# Patient Record
Sex: FEMALE | Race: WHITE | NOT HISPANIC OR LATINO | Employment: UNEMPLOYED | URBAN - METROPOLITAN AREA
[De-identification: names, ages, dates, MRNs, and addresses within clinical notes are randomized per-mention and may not be internally consistent; named-entity substitution may affect disease eponyms.]

---

## 2024-02-27 ENCOUNTER — HOSPITAL ENCOUNTER (EMERGENCY)
Facility: HOSPITAL | Age: 24
Discharge: OTHER INSTITUTION | End: 2024-02-28
Attending: EMERGENCY MEDICINE
Payer: COMMERCIAL

## 2024-02-27 ENCOUNTER — APPOINTMENT (OUTPATIENT)
Dept: CARDIOLOGY | Facility: HOSPITAL | Age: 24
End: 2024-02-27
Payer: COMMERCIAL

## 2024-02-27 DIAGNOSIS — F32.A DEPRESSION, UNSPECIFIED DEPRESSION TYPE: Primary | ICD-10-CM

## 2024-02-27 DIAGNOSIS — R45.851 SUICIDAL IDEATION: ICD-10-CM

## 2024-02-27 LAB
ALBUMIN SERPL BCP-MCNC: 4.1 G/DL (ref 3.4–5)
ALP SERPL-CCNC: 49 U/L (ref 33–110)
ALT SERPL W P-5'-P-CCNC: 7 U/L (ref 7–45)
AMPHETAMINES UR QL SCN: NORMAL
ANION GAP SERPL CALC-SCNC: 14 MMOL/L (ref 10–20)
APAP SERPL-MCNC: <10 UG/ML
APPEARANCE UR: CLEAR
AST SERPL W P-5'-P-CCNC: 12 U/L (ref 9–39)
B-HCG SERPL-ACNC: <2 MIU/ML
BACTERIA #/AREA URNS AUTO: ABNORMAL /HPF
BARBITURATES UR QL SCN: NORMAL
BASOPHILS # BLD AUTO: 0.06 X10*3/UL (ref 0–0.1)
BASOPHILS NFR BLD AUTO: 0.9 %
BENZODIAZ UR QL SCN: NORMAL
BILIRUB SERPL-MCNC: 0.2 MG/DL (ref 0–1.2)
BILIRUB UR STRIP.AUTO-MCNC: NEGATIVE MG/DL
BUN SERPL-MCNC: 5 MG/DL (ref 6–23)
BZE UR QL SCN: NORMAL
CALCIUM SERPL-MCNC: 9.2 MG/DL (ref 8.6–10.3)
CANNABINOIDS UR QL SCN: NORMAL
CHLORIDE SERPL-SCNC: 104 MMOL/L (ref 98–107)
CO2 SERPL-SCNC: 24 MMOL/L (ref 21–32)
COLOR UR: ABNORMAL
CREAT SERPL-MCNC: 0.72 MG/DL (ref 0.5–1.05)
EGFRCR SERPLBLD CKD-EPI 2021: >90 ML/MIN/1.73M*2
EOSINOPHIL # BLD AUTO: 0.16 X10*3/UL (ref 0–0.7)
EOSINOPHIL NFR BLD AUTO: 2.4 %
ERYTHROCYTE [DISTWIDTH] IN BLOOD BY AUTOMATED COUNT: 13.1 % (ref 11.5–14.5)
ETHANOL SERPL-MCNC: 42 MG/DL
FENTANYL+NORFENTANYL UR QL SCN: NORMAL
GLUCOSE SERPL-MCNC: 95 MG/DL (ref 74–99)
GLUCOSE UR STRIP.AUTO-MCNC: NEGATIVE MG/DL
HCT VFR BLD AUTO: 37.9 % (ref 36–46)
HGB BLD-MCNC: 12.2 G/DL (ref 12–16)
IMM GRANULOCYTES # BLD AUTO: 0.02 X10*3/UL (ref 0–0.7)
IMM GRANULOCYTES NFR BLD AUTO: 0.3 % (ref 0–0.9)
KETONES UR STRIP.AUTO-MCNC: NEGATIVE MG/DL
LEUKOCYTE ESTERASE UR QL STRIP.AUTO: ABNORMAL
LYMPHOCYTES # BLD AUTO: 2.66 X10*3/UL (ref 1.2–4.8)
LYMPHOCYTES NFR BLD AUTO: 40.3 %
MCH RBC QN AUTO: 28.1 PG (ref 26–34)
MCHC RBC AUTO-ENTMCNC: 32.2 G/DL (ref 32–36)
MCV RBC AUTO: 87 FL (ref 80–100)
MONOCYTES # BLD AUTO: 0.44 X10*3/UL (ref 0.1–1)
MONOCYTES NFR BLD AUTO: 6.7 %
MUCOUS THREADS #/AREA URNS AUTO: ABNORMAL /LPF
NEUTROPHILS # BLD AUTO: 3.26 X10*3/UL (ref 1.2–7.7)
NEUTROPHILS NFR BLD AUTO: 49.4 %
NITRITE UR QL STRIP.AUTO: NEGATIVE
NRBC BLD-RTO: 0 /100 WBCS (ref 0–0)
OPIATES UR QL SCN: NORMAL
OXYCODONE+OXYMORPHONE UR QL SCN: NORMAL
PCP UR QL SCN: NORMAL
PH UR STRIP.AUTO: 6 [PH]
PLATELET # BLD AUTO: 307 X10*3/UL (ref 150–450)
POTASSIUM SERPL-SCNC: 3.4 MMOL/L (ref 3.5–5.3)
PROT SERPL-MCNC: 7.2 G/DL (ref 6.4–8.2)
PROT UR STRIP.AUTO-MCNC: NEGATIVE MG/DL
RBC # BLD AUTO: 4.34 X10*6/UL (ref 4–5.2)
RBC # UR STRIP.AUTO: ABNORMAL /UL
RBC #/AREA URNS AUTO: ABNORMAL /HPF
SALICYLATES SERPL-MCNC: <3 MG/DL
SODIUM SERPL-SCNC: 139 MMOL/L (ref 136–145)
SP GR UR STRIP.AUTO: 1.01
SQUAMOUS #/AREA URNS AUTO: ABNORMAL /HPF
UROBILINOGEN UR STRIP.AUTO-MCNC: <2 MG/DL
WBC # BLD AUTO: 6.6 X10*3/UL (ref 4.4–11.3)
WBC #/AREA URNS AUTO: ABNORMAL /HPF
WBC CLUMPS #/AREA URNS AUTO: ABNORMAL /HPF

## 2024-02-27 PROCEDURE — 93005 ELECTROCARDIOGRAM TRACING: CPT

## 2024-02-27 PROCEDURE — 80053 COMPREHEN METABOLIC PANEL: CPT | Performed by: EMERGENCY MEDICINE

## 2024-02-27 PROCEDURE — 87086 URINE CULTURE/COLONY COUNT: CPT | Mod: AHULAB | Performed by: EMERGENCY MEDICINE

## 2024-02-27 PROCEDURE — 99285 EMERGENCY DEPT VISIT HI MDM: CPT | Mod: 25

## 2024-02-27 PROCEDURE — 85025 COMPLETE CBC W/AUTO DIFF WBC: CPT | Performed by: EMERGENCY MEDICINE

## 2024-02-27 PROCEDURE — 81001 URINALYSIS AUTO W/SCOPE: CPT | Mod: 59 | Performed by: EMERGENCY MEDICINE

## 2024-02-27 PROCEDURE — 80307 DRUG TEST PRSMV CHEM ANLYZR: CPT | Performed by: EMERGENCY MEDICINE

## 2024-02-27 PROCEDURE — 36415 COLL VENOUS BLD VENIPUNCTURE: CPT | Performed by: EMERGENCY MEDICINE

## 2024-02-27 PROCEDURE — 80143 DRUG ASSAY ACETAMINOPHEN: CPT | Performed by: EMERGENCY MEDICINE

## 2024-02-27 PROCEDURE — 84702 CHORIONIC GONADOTROPIN TEST: CPT | Performed by: EMERGENCY MEDICINE

## 2024-02-27 SDOH — HEALTH STABILITY: MENTAL HEALTH: BEHAVIORS/MOOD: COOPERATIVE;FLAT AFFECT;SAD;TEARFUL

## 2024-02-27 SDOH — HEALTH STABILITY: MENTAL HEALTH

## 2024-02-27 SDOH — HEALTH STABILITY: MENTAL HEALTH: WAS THIS WITHIN THE PAST THREE MONTHS?: NO

## 2024-02-27 SDOH — HEALTH STABILITY: MENTAL HEALTH: HAVE YOU EVER DONE ANYTHING, STARTED TO DO ANYTHING, OR PREPARED TO DO ANYTHING TO END YOUR LIFE?: YES

## 2024-02-27 SDOH — HEALTH STABILITY: MENTAL HEALTH: HAVE YOU ACTUALLY HAD ANY THOUGHTS OF KILLING YOURSELF?: NO

## 2024-02-27 SDOH — HEALTH STABILITY: MENTAL HEALTH: BEHAVIORS/MOOD: COOPERATIVE;CRYING

## 2024-02-27 SDOH — HEALTH STABILITY: MENTAL HEALTH: HAVE YOU WISHED YOU WERE DEAD OR WISHED YOU COULD GO TO SLEEP AND NOT WAKE UP?: NO

## 2024-02-27 SDOH — HEALTH STABILITY: MENTAL HEALTH: SUICIDE ASSESSMENT: ADULT (C-SSRS)

## 2024-02-27 ASSESSMENT — ENCOUNTER SYMPTOMS
ABDOMINAL PAIN: 0
BACK PAIN: 0
FEVER: 0
ARTHRALGIAS: 0
DYSURIA: 0
HEMATURIA: 0
COUGH: 0
SHORTNESS OF BREATH: 0
PALPITATIONS: 0
VOMITING: 0
COLOR CHANGE: 0
SORE THROAT: 0
EYE PAIN: 0
DYSPHORIC MOOD: 1
SEIZURES: 0
CHILLS: 0

## 2024-02-27 ASSESSMENT — COLUMBIA-SUICIDE SEVERITY RATING SCALE - C-SSRS
6. HAVE YOU EVER DONE ANYTHING, STARTED TO DO ANYTHING, OR PREPARED TO DO ANYTHING TO END YOUR LIFE?: NO
2. HAVE YOU ACTUALLY HAD ANY THOUGHTS OF KILLING YOURSELF?: NO
1. IN THE PAST MONTH, HAVE YOU WISHED YOU WERE DEAD OR WISHED YOU COULD GO TO SLEEP AND NOT WAKE UP?: NO

## 2024-02-28 VITALS
SYSTOLIC BLOOD PRESSURE: 100 MMHG | WEIGHT: 110 LBS | OXYGEN SATURATION: 99 % | HEART RATE: 66 BPM | RESPIRATION RATE: 18 BRPM | TEMPERATURE: 97.9 F | DIASTOLIC BLOOD PRESSURE: 61 MMHG | HEIGHT: 62 IN | BODY MASS INDEX: 20.24 KG/M2

## 2024-02-28 LAB
FLUAV RNA RESP QL NAA+PROBE: NOT DETECTED
FLUBV RNA RESP QL NAA+PROBE: NOT DETECTED
SARS-COV-2 RNA RESP QL NAA+PROBE: NOT DETECTED

## 2024-02-28 PROCEDURE — 87636 SARSCOV2 & INF A&B AMP PRB: CPT | Performed by: STUDENT IN AN ORGANIZED HEALTH CARE EDUCATION/TRAINING PROGRAM

## 2024-02-28 SDOH — HEALTH STABILITY: MENTAL HEALTH: ACTIVE SUICIDAL IDEATION WITH SPECIFIC PLAN AND INTENT (PAST 1 MONTH): YES

## 2024-02-28 SDOH — ECONOMIC STABILITY: HOUSING INSECURITY: FEELS SAFE LIVING IN HOME: YES

## 2024-02-28 SDOH — HEALTH STABILITY: MENTAL HEALTH: SUICIDAL BEHAVIOR (LIFETIME): YES

## 2024-02-28 SDOH — HEALTH STABILITY: MENTAL HEALTH: NON-SPECIFIC ACTIVE SUICIDAL THOUGHTS (PAST 1 MONTH): YES

## 2024-02-28 SDOH — HEALTH STABILITY: MENTAL HEALTH: WISH TO BE DEAD (PAST 1 MONTH): YES

## 2024-02-28 SDOH — HEALTH STABILITY: MENTAL HEALTH: ACTIVE SUICIDAL IDEATION WITH SOME INTENT TO ACT, WITHOUT SPECIFIC PLAN (PAST 1 MONTH): YES

## 2024-02-28 SDOH — HEALTH STABILITY: MENTAL HEALTH
DEPRESSION SYMPTOMS: CHANGE IN ENERGY LEVEL;FEELINGS OF HELPLESSNESS;FEELINGS OF HOPELESSESS;FEELINGS OF WORTHLESSNESS;ISOLATIVE;LOSS OF INTEREST

## 2024-02-28 SDOH — HEALTH STABILITY: MENTAL HEALTH: ANXIETY SYMPTOMS: NO PROBLEMS REPORTED OR OBSERVED.

## 2024-02-28 SDOH — ECONOMIC STABILITY: GENERAL

## 2024-02-28 SDOH — HEALTH STABILITY: MENTAL HEALTH: SUICIDAL BEHAVIOR (3 MONTHS): YES

## 2024-02-28 ASSESSMENT — LIFESTYLE VARIABLES
PRESCIPTION_ABUSE_PAST_12_MONTHS: NO
SUBSTANCE_ABUSE_PAST_12_MONTHS: NO

## 2024-02-28 ASSESSMENT — ACTIVITIES OF DAILY LIVING (ADL): LACK_OF_TRANSPORTATION: NO

## 2024-02-28 NOTE — PROGRESS NOTES
From home with mother  EPAT to place- ? [place]     02/28/24 0852   Current Planned Discharge Disposition   Current Planned Discharge Disposition Psych

## 2024-02-28 NOTE — PROGRESS NOTES
EPAT - Social Work Psychiatric Assessment    Arrival Details  Mode of Arrival: Ambulance  Admission Source:  (AA Meeting)  Admission Type: Involuntary  EPAT Assessment Start Date: 02/28/24  EPAT Assessment Start Time: 0105  Name of : ILLI Morales LSW    History of Present Illness  Admission Reason: Suicidal Ideation  HPI: Patient is a 24yo female presenting to the ED via EMS with chief complaint of suicidal ideation. Reportedly, “patient has been seeing a therapist for depression. Pt was at one of her group meetings today where she expressed stuff going on with her and someone from the group called 911 and stated that the patient expressed suicidal thoughts. Patient denies SI or HI”. Patient’s chart, triage, and provider note reviewed prior to assessment. Patient has a psychiatric history of Depression and AUD. She is currently engaging in AA and weekly outpatient therapy. She reports current medication of Lexapro and endorses compliance. Patient endorses remote hx of suicide attempt over a year ago but was unwilling to share details, is indicated moderate risk at triage. She reports two previous psychiatric admissions, most recently at [facility] 11/2023. BAL 42 on arrival, UTOX not available.    SW Readmission Information   Readmission within 30 Days: No    Psychiatric Symptoms  Anxiety Symptoms: No problems reported or observed.  Depression Symptoms: Change in energy level, Feelings of helplessness, Feelings of hopelessess, Feelings of worthlessness, Isolative, Loss of interest  Yvette Symptoms: No problems reported or observed.    Psychosis Symptoms  Hallucination Type: No problems reported or observed.  Delusion Type: No problems reported or observed.    Additional Symptoms - Adult  Generalized Anxiety Disorder: No problems reported or observed.  Obsessive Compulsive Disorder: No problems reported or observed.  Panic Attack: No problems reported or observed.  Post Traumatic Stress Disorder: No  problems reported or observed.  Delirium: No problems reported or observed.    Past Psychiatric History/Meds/Treatments  Past Psychiatric History: Psychiatric Diagnosis: MDD, AUD // Current MH Center: pt reports therapist weekly // Previous Admissions: [facility] 11/2023, one previous in [place] // History of self-harm/SA: remote hx of suicide attempt over a year ago  Past Psychiatric Meds/Treatments: Lexapro, reports compliance but no prescribing physician once refill is complete  Past Violence/Victimization History: none    Current Mental Health Contacts   Name/Phone Number: Weekly therapist   Last Appointment Date: unknown  Provider Name/Phone Number: none  Provider Last Appointment Date: none    Support System: Immediate family    Living Arrangement: House, Lives with someone    Home Safety  Feels Safe Living in Home: Yes    Income Information  Employment Status for: Patient  Employment Status: Employed  Income Source: Employed  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  Financial Concerns: None    [employer] [place] Service/Education History  Current or Previous  Service: None  Education Level:  (Some college)  History of Learning Problems: No  History of School Behavior Problems: No    Social/Cultural History  Social History: [place] Citizen: Yes // Payee: none // Guardian/POA: Self  Cultural Requests During Hospitalization: none  Spiritual Requests During Hospitalization: none  Important Activities:  (none reported)    Legal  Criminal Activity/ Legal Involvement Pertinent to Current Situation/ Hospitalization: None  Legal Concerns: Denies    Drug Screening  Have you used any substances (canabis, cocaine, heroin, hallucinogens, inhalants, etc.) in the past 12 months?: No  Have you used any prescription drugs other than prescribed in the past 12 months?: No  Is a toxicology screen needed?: Yes    Stage of Change  Stage of Change: Action  History of  Treatment: AA/NA meetings  Type of Treatment Offered: Inpatient (Psych)  Treatment Offered:  (n/a)  Duration of Substance Use: unknown  Frequency of Substance Use: occasional ETOH still  Age of First Substance Use: unknown    Psychosocial  Psychosocial (WDL): Exceptions to WDL  Behaviors/Mood: Calm, Guarded, Withdrawn, Sad  Affect: Appropriate to circumstances    Orientation  Orientation Level: Oriented X4    General Appearance  Motor Activity: Unremarkable  Speech Pattern: Excessively soft  General Attitude: Guarded, Withdrawn  Appearance/Hygiene: Unremarkable    Thought Process  Coherency:  (Unremarkable)  Content: Unremarkable  Delusions:  (None)  Perception: Not altered  Hallucination: None  Judgment/Insight: Impaired  Confusion: None  Cognition: Appropriate attention/concentration, Follows commands    Sleep Pattern  Sleep Pattern: Disturbed/interrupted sleep    Risk Factors  Self Harm/Suicidal Ideation Plan: Current suicide attempt  Previous Self Harm/Suicidal Plans: Remote hx of SA over a year ago, declined to provide details  Risk Factors: Major mental illness, Substance abuse    Violence Risk Assessment  Assessment of Violence: None noted  Thoughts of Harm to Others: No    Ability to Assess Risk Screen  Risk Screen - Ability to Assess: Able to be screened  Socorro Suicide Severity Rating Scale (Screener/Recent Self-Report)  1. Wish to be Dead (Past 1 Month): Yes  2. Non-Specific Active Suicidal Thoughts (Past 1 Month): Yes  3. Active Suicidal Ideation with any Methods (Not Plan) Without Intent to Act (Past 1 Month): Yes  4. Active Suicidal Ideation with Some Intent to Act, Without Specific Plan (Past 1 Month): Yes  5. Active Suicidal Ideation with Specific Plan and Intent (Past 1 Month): Yes  6. Suicidal Behavior (Lifetime): Yes  6. Suicidal Behavior (3 Months): Yes  Calculated C-SSRS Risk Score (Lifetime/Recent): High Risk  Step 1: Risk Factors  Current & Past Psychiatric Dx: Mood disorder,  Alcohol/substance abuse disorders  Presenting Symptoms: Anhedonia, Impulsivity  Precipitants/Stressors: Substance intoxication or withdrawal, Inadequate social supports, Perceived burden on others  Change in Treatment: Hopeless or dissatisfied with provider or treatment  Access to Lethal Methods : No  Step 2: Protective Factors   Protective Factors Internal: Frustration tolerance  Protective Factors External: Supportive social network or family or friends, Engaged in work or school  Step 3: Suicidal Ideation Intensity  Most Severe Suicidal Ideation Identified: Suicide attempt by CO2  How Many Times Have You Had These Thoughts: 2-5 times in a week  When You Have the Thoughts How Long do They Last : 1-4 hours/a lot of the time  Could/Can You Stop Thinking About Killing Yourself or Wanting to Die if You Want to: Unable to control thoughts  Are There Things - Anyone or Anything - That Stopped You From Wanting to Die or Acting on: Deterrents most definitely did not stop you  What Sort of Reasons Did You Have For Thinking About Wanting to Die or Killing Yourself: Mostly to end or stop the pain (you couldn't go on living with the pain or how you were feeling)  Total Score: 20  Step 5: Documentation  Risk Level: High suicide risk (Patient upgraded to high risk given disclosure of suicide attempt earlier today, discussed with Dr. Liang)    Psychiatric Impression and Plan of Care  Assessment and Plan:     Upon assessment the patient was acutely dysthymic, withdrawn, and unwilling to engage. She required significant encouragement to provide details regarding current encounter and appeared to be minimizing her presentation. The patient ultimately endorsed having made suicidal statements while in her AA meeting and had reported “I tried using my car exhaust to kill myself but it didn't work because I have a catalytic converter on my car” earlier today. Patient endorsed this to have been an attempt to end her life and appeared  irritable to be in the ED, stating “I didn't want to be here”. She declined to provide any reasoning, triggers, or current stressors contributing to her suicide attempt earlier today. The patient reports she has been engaging in AA since this past summer but continues to drink on occasion. She reports “okay” sleep but has trouble staying asleep, denies recent disturbances in appetite or ADLs. She has a therapist whom she speaks with on a weekly basis and has been compliant with Lexapro, however, does not currently have a prescribing doctor. The patient lives with her mother, endorses feeling safe in the home, and denies access to firearms. She works at [employer] and completed some college. Patient failed to remain future/goal-oriented or help-seeking, unable to provide significant insight into symptomology. She failed to identify any major supports or deterrents to continued efforts to self-harm. Patient otherwise denied HI/AH/VH and no delusions were elicited.     Diagnostic Impression: MDD, AUD  Psychiatric Impression and Plan for Care: Patient presents as an acute risk to self given suicide attempt earlier today. Recommendation for admission discussed with Dr. Liang who is in agreement.     Specific Resources Provided to Patient: Admission    Outcome/Disposition  Patient's Perception of Outcome Achieved: unable to assess  Assessment, Recommendations and Risk Level Reviewed with: Dr. Liang  Contact Name: none provided  Contact Number(s): -  Contact Relationship: -  EPAT Assessment Completed Date: 02/28/24  EPAT Assessment Completed Time: 2288

## 2024-02-28 NOTE — PROGRESS NOTES
Patient is a 23-year-old female who presented to the emergency room for suicidal ideation and concern for possible suicide attempt.  She was initially seen and evaluated by Dr. Antoine and signed out to me by Dr. Liang after being medically cleared.  She was pending evaluation by her emergency psychiatric team.  They have evaluated patient and determined that she meets inpatient criteria.  She has been accepted to [facility] by Dr. Christina.       Vanessa Merida MD

## 2024-02-28 NOTE — ED PROVIDER NOTES
HPI   Chief Complaint   Patient presents with    Suicidal     Patient has been seeing a therapist for depression, Pt was at one of her group meetings today where she expressed stuff going on with her and someone from the group called 911 and stated that the patient expressed suicidal thoughts. Patient denies SI or HI.         23-year-old female, apparently at AA meeting, expressing feelings of depression.  Uncertain if any true suicidal statements were made, but someone at the meeting felt the need to contact 911 based on her depressive symptomatology and possible suicidal statements.  According to EMS information gleaned from the meeting personnel, she made some statement about having a garage door crush her.  Patient presents denying active suicidal or homicidal ideation but is cooperative at this point time.  Has known history of depression on Lexapro.  Denies hallucinations.  Has 1 prior attempt in the past which she will not elucidate on but was approximately a year or so ago.                          No data recorded                  Patient History   No past medical history on file.  Past Surgical History:   Procedure Laterality Date    OTHER SURGICAL HISTORY  01/03/2023    Tonsillectomy     No family history on file.  Social History     Tobacco Use    Smoking status: Not on file    Smokeless tobacco: Not on file   Substance Use Topics    Alcohol use: Not on file    Drug use: Not on file       Review of Systems   Constitutional:  Negative for chills and fever.   HENT:  Negative for ear pain and sore throat.    Eyes:  Negative for pain and visual disturbance.   Respiratory:  Negative for cough and shortness of breath.    Cardiovascular:  Negative for chest pain and palpitations.   Gastrointestinal:  Negative for abdominal pain and vomiting.   Genitourinary:  Negative for dysuria and hematuria.   Musculoskeletal:  Negative for arthralgias and back pain.   Skin:  Negative for color change and rash.    Neurological:  Negative for seizures and syncope.   Psychiatric/Behavioral:  Positive for dysphoric mood.    All other systems reviewed and are negative.       Physical Exam   ED Triage Vitals   Temp Pulse Resp BP   -- -- -- --      SpO2 Temp src Heart Rate Source Patient Position   -- -- -- --      BP Location FiO2 (%)     -- --       Physical Exam  Vitals reviewed.   Constitutional:       General: She is not in acute distress.     Appearance: She is well-developed.   HENT:      Head: Normocephalic and atraumatic.      Right Ear: External ear normal.      Left Ear: External ear normal.      Nose: Nose normal.      Mouth/Throat:      Mouth: Mucous membranes are moist.      Pharynx: Oropharynx is clear.   Eyes:      Conjunctiva/sclera: Conjunctivae normal.      Pupils: Pupils are equal, round, and reactive to light.   Neck:      Vascular: No JVD.   Cardiovascular:      Rate and Rhythm: Normal rate and regular rhythm.      Pulses: Normal pulses.   Pulmonary:      Effort: Pulmonary effort is normal. No accessory muscle usage or respiratory distress.      Breath sounds: Normal breath sounds.   Abdominal:      General: Abdomen is flat. There is no distension.      Palpations: Abdomen is soft. There is no mass.      Tenderness: There is no abdominal tenderness.   Musculoskeletal:         General: Normal range of motion.      Cervical back: Normal range of motion and neck supple.   Lymphadenopathy:      Cervical: No cervical adenopathy.   Skin:     General: Skin is warm and dry.      Capillary Refill: Capillary refill takes less than 2 seconds.      Comments: No zoster rash seen   Neurological:      General: No focal deficit present.      Mental Status: She is alert. Mental status is at baseline.   Psychiatric:         Mood and Affect: Mood normal.      Comments: Suggestion that the patient may be suicidal.  She states it may have been misconstrued to some extent denies acute active suicidal or homicidal ideation, but  nonetheless statements made in the meeting warrant psychiatric evaluation.                 ECG if performed, ordered and interpreted by ED physician:        Labs Reviewed   COMPREHENSIVE METABOLIC PANEL - Abnormal       Result Value    Glucose 95      Sodium 139      Potassium 3.4 (*)     Chloride 104      Bicarbonate 24      Anion Gap 14      Urea Nitrogen 5 (*)     Creatinine 0.72      eGFR >90      Calcium 9.2      Albumin 4.1      Alkaline Phosphatase 49      Total Protein 7.2      AST 12      Bilirubin, Total 0.2      ALT 7     ACUTE TOXICOLOGY PANEL, BLOOD - Abnormal    Acetaminophen <10.0      Salicylate  <3      Alcohol 42 (*)    URINALYSIS WITH REFLEX CULTURE AND MICROSCOPIC - Abnormal    Color, Urine Straw      Appearance, Urine Clear      Specific Gravity, Urine 1.006      pH, Urine 6.0      Protein, Urine NEGATIVE      Glucose, Urine NEGATIVE      Blood, Urine LARGE (3+) (*)     Ketones, Urine NEGATIVE      Bilirubin, Urine NEGATIVE      Urobilinogen, Urine <2.0      Nitrite, Urine NEGATIVE      Leukocyte Esterase, Urine TRACE (*)    MICROSCOPIC ONLY, URINE - Abnormal    WBC, Urine 1-5      WBC Clumps, Urine OCCASIONAL      RBC, Urine 1-2      Squamous Epithelial Cells, Urine 1-9 (SPARSE)      Bacteria, Urine 1+ (*)     Mucus, Urine 1+     URINE CULTURE - Normal    Urine Culture No significant growth     DRUG SCREEN,URINE - Normal    Amphetamine Screen, Urine Presumptive Negative      Barbiturate Screen, Urine Presumptive Negative      Benzodiazepines Screen, Urine Presumptive Negative      Cannabinoid Screen, Urine Presumptive Negative      Cocaine Metabolite Screen, Urine Presumptive Negative      Fentanyl Screen, Urine Presumptive Negative      Opiate Screen, Urine Presumptive Negative      Oxycodone Screen, Urine Presumptive Negative      PCP Screen, Urine Presumptive Negative      Narrative:     Drug screen results are presumptive and should not be used to assess   compliance with prescribed  medication. Contact the performing [facility] laboratory   to add-on definitive confirmatory testing if clinically indicated.    Toxicology screening results are reported qualitatively. The concentration must   be greater than or equal to the cutoff to be reported as positive. The concentration   at which the screening test can detect an individual drug or metabolite varies.   The absence of expected drug(s) and/or drug metabolite(s) may indicate non-compliance,   inappropriate timing of specimen collection relative to drug administration, poor drug   absorption, diluted/adulterated urine, or limitations of testing. For medical purposes   only; not valid for forensic use.    Interpretive questions should be directed to the laboratory medical directors.   HUMAN CHORIONIC GONADOTROPIN, SERUM QUANTITATIVE - Normal    HCG, Beta-Quantitative <2      Narrative:      Total HCG measurement is performed using the [employer] Access   Immunoassay which detects intact HCG and free beta HCG subunit.    This test is not indicated for use as a tumor marker.   HCG testing is performed using a different test methodology at [facility] than other [facility]. Direct result comparison   should only be made within the same method.       SARS-COV-2 AND INFLUENZA A/B PCR - Normal    Flu A Result Not Detected      Flu B Result Not Detected      Coronavirus 2019, PCR Not Detected      Narrative:     This assay has received FDA Emergency Use Authorization (EUA) and  is only authorized for the duration of time that circumstances exist to justify the authorization of the emergency use of in vitro diagnostic tests for the detection of SARS-CoV-2 virus and/or diagnosis of COVID-19 infection under section 564(b)(1) of the Act, 21 U.S.C. 360bbb-3(b)(1). Testing for SARS-CoV-2 is only recommended for patients who meet current clinical and/or epidemiological criteria as defined by federal, state, or local public health  directives. This assay is an in vitro diagnostic nucleic acid amplification test for the qualitative detection of SARS-CoV-2, Influenza A, and Influenza B from nasopharyngeal specimens and has been validated for use at [facility]. Negative results do not preclude COVID-19 infections or Influenza A/B infections, and should not be used as the sole basis for diagnosis, treatment, or other management decisions. If Influenza A/B and RSV PCR results are negative, testing for Parainfluenza virus, Adenovirus and Metapneumovirus is routinely performed for [facility] pediatric oncology and intensive care inpatients, and is available on other patients by placing an add-on request.    CBC WITH AUTO DIFFERENTIAL    WBC 6.6      nRBC 0.0      RBC 4.34      Hemoglobin 12.2      Hematocrit 37.9      MCV 87      MCH 28.1      MCHC 32.2      RDW 13.1      Platelets 307      Neutrophils % 49.4      Immature Granulocytes %, Automated 0.3      Lymphocytes % 40.3      Monocytes % 6.7      Eosinophils % 2.4      Basophils % 0.9      Neutrophils Absolute 3.26      Immature Granulocytes Absolute, Automated 0.02      Lymphocytes Absolute 2.66      Monocytes Absolute 0.44      Eosinophils Absolute 0.16      Basophils Absolute 0.06     URINALYSIS WITH REFLEX CULTURE AND MICROSCOPIC    Narrative:     The following orders were created for panel order Urinalysis with Reflex Culture and Microscopic.  Procedure                               Abnormality         Status                     ---------                               -----------         ------                     Urinalysis with Reflex C...[[id number]]  Abnormal            Final result               Extra Urine Gray Tube[[id number]]                                                         Please view results for these tests on the individual orders.          No orders to display            ED Course & MDM                ED Course as of 03/14/24 1702   Tue Feb 27, 2024   0592  ECG 12 Lead  EcG ordered and interpreted by ED physician demonstrates sinus rhythm.  77 bpm.  Normal rate rhythm and axis.  No ischemic findings.  Unremarkable ECG. [KS]      ED Course User Index  [KS] Jack Antoine MD MPH         Diagnoses as of 03/14/24 1702   Depression, unspecified depression type   Suicidal ideation       Medical Decision Making  Significant depression, questionable suicidal ideation.  Baseline toxicologic metabolic evaluation and then psychiatric disposition/evaluation.    Alcohol level 42.  Other labs unremarkable overall.    Patient medically stable and medically cleared for psychiatric disposition.          Procedure  Procedures         Jack Antoine MD MPH  02/27/24 2346       Jack Antoine MD MPH  03/08/24 1700       Jack Antoine MD MPH  03/14/24 1702

## 2024-02-28 NOTE — SIGNIFICANT EVENT
Application for Emergency Admission      Ready for Transfer?  Is the patient medically cleared for transfer to inpatient psychiatry: Yes  Has the patient been accepted to an inpatient psychiatric hospital: Yes    Application for Emergency Admission  IN ACCORDANCE WITH [place]122[place]  The Chief Clinical Officer of: Margaret 2/28/2024 .12:19 PM    Reason for Hospitalization  The undersigned has reason to believe that: Tete Kirk Is a mentally ill person subject to hospitalization by court order under division B Section 5122.01 of the Revised Code, i.e., this person:    1.Yes  Represents a substantial risk of physical harm to self as manifested by evidence of threats of, or attempts at, suicide or serious self-inflicted bodily harm    2.No Represents a substantial risk of physical harm to others as manifested by evidence of recent homicidal or other violent behavior, evidence of recent threats that place another in reasonable fear of violent behavior and serious physical harm, or other evidence of present dangerousness    3.Yes Represents a substantial and immediate risk of serious physical impairment or injury to self as manifested by  evidence that the person is unable to provide for and is not providing for the person's basic physical needs because of the person's mental illness and that appropriate provision for those needs cannot be made  immediately available in the community    4.Yes Would benefit from treatment in a hospital for his mental illness and is in need of such treatment as manifested by evidence of behavior that creates a grave and imminent risk to substantial rights of others or  himself.    5.Yes Would benefit from treatment as manifested by evidence of behavior that indicates all of the following:       (a) The person is unlikely to survive safely in the community without supervision, based on a clinical determination.       (b) The person has a history of lack of compliance with  treatment for mental illness and one of the following applies:      (i) At least twice within the thirty-six months prior to the filing of an affidavit seeking court-ordered treatment of the person under section 5122.111 of the Revised Code, the lack of compliance has been a significant factor in necessitating hospitalization in a hospital or receipt of services in a forensic or other mental health unit of a correctional facility, provided that the thirty-six-month period shall be extended by the length of any hospitalization or incarceration of the person that occurred within the thirty-six-month period.      (ii) Within the forty-eight months prior to the filing of an affidavit seeking court-ordered treatment of the person under section 5122.111 of the Revised Code, the lack of compliance resulted in one or more acts of serious violent behavior toward self or others or threats of, or attempts at, serious physical harm to self or others, provided that the forty-eight-month period shall be extended by the length of any hospitalization or incarceration of the person that occurred within the forty-eight-month period.      (c) The person, as a result of mental illness, is unlikely to voluntarily participate in necessary treatment.       (d) In view of the person's treatment history and current behavior, the person is in need of treatment in order to prevent a relapse or deterioration that would be likely to result in substantial risk of serious harm to the person or others.    (e) Represents a substantial risk of physical harm to self or others if allowed to remain at liberty pending examination.    Therefore, it is requested that said person be admitted to the above named facility.    STATEMENT OF BELIEF    Must be filled out by one of the following: a psychiatrist, licensed physician, licensed clinical psychologist, health or ,  or .  (Statement shall include the circumstances under  which the individual was taken into custody and the reason for the person's belief that hospitalization is necessary. The statement shall also include a reference to efforts made to secure the individual's property at his residence if he was taken into custody there. Every reasonable and appropriate effort should be made to take this person into custody in the least conspicuous manner possible.)        Vanessa Merida MD 2/28/2024 for Dr Liang    _____________________________________________________________   Place of Employment: [facility]    STATEMENT OF OBSERVATION BY PSYCHIATRIST, LICENSED PHYSICIAN, OR LICENSED CLINICAL PSYCHOLOGIST, IF APPLICABLE    Place of Observation (e.g., [facility], general hospital, office, emergency facility)  (If applicable, please complete)    Vansesa Merida MD 2/28/2024    _____________________________________________________________

## 2024-02-28 NOTE — PROGRESS NOTES
Transitional Care Coordination Progress Note:  Plan per Medical/Surgical team: treatment of SI with EPAT to place  Status: ED  Payor source: [employer] [place]  Discharge disposition: Home with mother, ?[place]  Potential Barriers: Hx of depression  ADOD: 2/29/2024  RICHARD Allen RN, BSN Transitional Care Coordinator ED# [phone]      02/28/24 0853   Discharge Planning   Living Arrangements Parent   Support Systems Parent;Organized support group (Comment)  (AA, outpatient therapy)   Assistance Needed EPAT to place   Type of Residence Private residence   Number of Stairs to Enter Residence 5   Number of Stairs Within Residence 15   Home or Post Acute Services Community services   Patient expects to be discharged to: from home with mother, EPAT to place, ?[place]   Does the patient need discharge transport arranged? Yes   RoundTrip coordination needed? Yes   Has discharge transport been arranged? No   Financial Resource Strain   How hard is it for you to pay for the very basics like food, housing, medical care, and heating? Not hard   Housing Stability   In the last 12 months, was there a time when you were not able to pay the mortgage or rent on time? N   In the last 12 months, how many places have you lived? 1   In the last 12 months, was there a time when you did not have a steady place to sleep or slept in a shelter (including now)? N   Transportation Needs   In the past 12 months, has lack of transportation kept you from medical appointments or from getting medications? no   In the past 12 months, has lack of transportation kept you from meetings, work, or from getting things needed for daily living? No

## 2024-02-28 NOTE — PROGRESS NOTES
I received Tete Kirk in signout from Dr. Antoine.  Please see the previous note for all HPI, PE and MDM up to the time of signout.    In brief Tete Kirk is an 23 y.o. female presenting for   Chief Complaint   Patient presents with    Suicidal     Patient has been seeing a therapist for depression, Pt was at one of her group meetings today where she expressed stuff going on with her and someone from the group called 911 and stated that the patient expressed suicidal thoughts. Patient denies SI or HI.   .  At the time of signout we were awaiting:    Pending EPAT evaluation due to possible suicidal ideation.  After EPAT team was consulted and did see the patient she reportedly admitted to a possible suicide attempt although without any additional information provided earlier today which she reportedly shared with her alcoholic Anonymous group.  Given this they did recommend admission to inpatient psychiatric center.  She is medically clear from my standpoint for inpatient psychiatric treatment at this time and will be transferred for further management    Pt Disposition:     Transfer to inpatient psychiatric center for further management.

## 2024-02-29 LAB
ATRIAL RATE: 77 BPM
BACTERIA UR CULT: NORMAL
P AXIS: 46 DEGREES
P OFFSET: 202 MS
P ONSET: 164 MS
PR INTERVAL: 114 MS
Q ONSET: 221 MS
QRS COUNT: 12 BEATS
QRS DURATION: 76 MS
QT INTERVAL: 374 MS
QTC CALCULATION(BAZETT): 423 MS
QTC FREDERICIA: 406 MS
R AXIS: 74 DEGREES
T AXIS: 50 DEGREES
T OFFSET: 408 MS
VENTRICULAR RATE: 77 BPM

## 2024-04-16 ENCOUNTER — APPOINTMENT (OUTPATIENT)
Dept: CARDIOLOGY | Facility: HOSPITAL | Age: 24
DRG: 917 | End: 2024-04-16
Payer: COMMERCIAL

## 2024-04-16 ENCOUNTER — HOSPITAL ENCOUNTER (INPATIENT)
Facility: HOSPITAL | Age: 24
LOS: 2 days | Discharge: PSYCHIATRIC OR SUBSTANCE USE FACILITY | DRG: 917 | End: 2024-04-18
Attending: STUDENT IN AN ORGANIZED HEALTH CARE EDUCATION/TRAINING PROGRAM | Admitting: INTERNAL MEDICINE
Payer: COMMERCIAL

## 2024-04-16 DIAGNOSIS — J96.01 ACUTE RESPIRATORY FAILURE WITH HYPOXIA (MULTI): ICD-10-CM

## 2024-04-16 DIAGNOSIS — T14.91XA SUICIDE ATTEMPT (MULTI): ICD-10-CM

## 2024-04-16 DIAGNOSIS — T50.902A INTENTIONAL DRUG OVERDOSE, INITIAL ENCOUNTER (MULTI): Primary | ICD-10-CM

## 2024-04-16 PROBLEM — F32.A DEPRESSION: Status: ACTIVE | Noted: 2024-04-16

## 2024-04-16 LAB
ALBUMIN SERPL-MCNC: 4.6 G/DL (ref 3.5–5)
ALP BLD-CCNC: 57 U/L (ref 35–125)
ALT SERPL-CCNC: 10 U/L (ref 5–40)
AMPHETAMINES UR QL SCN>1000 NG/ML: POSITIVE
ANION GAP SERPL CALC-SCNC: 14 MMOL/L
APAP SERPL-MCNC: <5 UG/ML
AST SERPL-CCNC: 20 U/L (ref 5–40)
ATRIAL RATE: 88 BPM
BARBITURATES UR QL SCN>300 NG/ML: NEGATIVE
BASOPHILS # BLD AUTO: 0.04 X10*3/UL (ref 0–0.1)
BASOPHILS NFR BLD AUTO: 0.5 %
BENZODIAZ UR QL SCN>300 NG/ML: NEGATIVE
BILIRUB SERPL-MCNC: 0.7 MG/DL (ref 0.1–1.2)
BUN SERPL-MCNC: 6 MG/DL (ref 8–25)
BZE UR QL SCN>300 NG/ML: NEGATIVE
CALCIUM SERPL-MCNC: 9.3 MG/DL (ref 8.5–10.4)
CANNABINOIDS UR QL SCN>50 NG/ML: NEGATIVE
CHLORIDE SERPL-SCNC: 99 MMOL/L (ref 97–107)
CO2 SERPL-SCNC: 23 MMOL/L (ref 24–31)
CREAT SERPL-MCNC: 0.6 MG/DL (ref 0.4–1.6)
EGFRCR SERPLBLD CKD-EPI 2021: >90 ML/MIN/1.73M*2
EOSINOPHIL # BLD AUTO: 0.07 X10*3/UL (ref 0–0.7)
EOSINOPHIL NFR BLD AUTO: 0.9 %
ERYTHROCYTE [DISTWIDTH] IN BLOOD BY AUTOMATED COUNT: 13.3 % (ref 11.5–14.5)
ETHANOL SERPL-MCNC: <0.01 G/DL
FENTANYL+NORFENTANYL UR QL SCN: POSITIVE
FLUAV RNA RESP QL NAA+PROBE: NOT DETECTED
FLUBV RNA RESP QL NAA+PROBE: NOT DETECTED
GLUCOSE SERPL-MCNC: 94 MG/DL (ref 65–99)
HCG UR QL IA.RAPID: NEGATIVE
HCT VFR BLD AUTO: 39 % (ref 36–46)
HGB BLD-MCNC: 12.7 G/DL (ref 12–16)
IMM GRANULOCYTES # BLD AUTO: 0.01 X10*3/UL (ref 0–0.7)
IMM GRANULOCYTES NFR BLD AUTO: 0.1 % (ref 0–0.9)
LYMPHOCYTES # BLD AUTO: 1.59 X10*3/UL (ref 1.2–4.8)
LYMPHOCYTES NFR BLD AUTO: 21.3 %
MCH RBC QN AUTO: 27.9 PG (ref 26–34)
MCHC RBC AUTO-ENTMCNC: 32.6 G/DL (ref 32–36)
MCV RBC AUTO: 86 FL (ref 80–100)
METHADONE UR QL SCN>300 NG/ML: NEGATIVE
MONOCYTES # BLD AUTO: 0.68 X10*3/UL (ref 0.1–1)
MONOCYTES NFR BLD AUTO: 9.1 %
NEUTROPHILS # BLD AUTO: 5.07 X10*3/UL (ref 1.2–7.7)
NEUTROPHILS NFR BLD AUTO: 68.1 %
NRBC BLD-RTO: 0 /100 WBCS (ref 0–0)
OPIATES UR QL SCN>300 NG/ML: POSITIVE
OXYCODONE UR QL: NEGATIVE
P AXIS: 77 DEGREES
P OFFSET: 201 MS
P ONSET: 159 MS
PCP UR QL SCN>25 NG/ML: NEGATIVE
PLATELET # BLD AUTO: 231 X10*3/UL (ref 150–450)
POTASSIUM SERPL-SCNC: 3.2 MMOL/L (ref 3.4–5.1)
PR INTERVAL: 118 MS
PROT SERPL-MCNC: 7.5 G/DL (ref 5.9–7.9)
Q ONSET: 218 MS
QRS COUNT: 14 BEATS
QRS DURATION: 88 MS
QT INTERVAL: 386 MS
QTC CALCULATION(BAZETT): 467 MS
QTC FREDERICIA: 438 MS
R AXIS: 68 DEGREES
RBC # BLD AUTO: 4.55 X10*6/UL (ref 4–5.2)
SALICYLATES SERPL-MCNC: <0 MG/DL
SARS-COV-2 RNA RESP QL NAA+PROBE: NOT DETECTED
SODIUM SERPL-SCNC: 136 MMOL/L (ref 133–145)
T AXIS: 24 DEGREES
T OFFSET: 411 MS
VENTRICULAR RATE: 88 BPM
WBC # BLD AUTO: 7.5 X10*3/UL (ref 4.4–11.3)

## 2024-04-16 PROCEDURE — 36415 COLL VENOUS BLD VENIPUNCTURE: CPT | Performed by: STUDENT IN AN ORGANIZED HEALTH CARE EDUCATION/TRAINING PROGRAM

## 2024-04-16 PROCEDURE — 99418 PROLNG IP/OBS E/M EA 15 MIN: CPT

## 2024-04-16 PROCEDURE — 2500000004 HC RX 250 GENERAL PHARMACY W/ HCPCS (ALT 636 FOR OP/ED)

## 2024-04-16 PROCEDURE — 51702 INSERT TEMP BLADDER CATH: CPT

## 2024-04-16 PROCEDURE — 96374 THER/PROPH/DIAG INJ IV PUSH: CPT

## 2024-04-16 PROCEDURE — 85025 COMPLETE CBC W/AUTO DIFF WBC: CPT | Performed by: STUDENT IN AN ORGANIZED HEALTH CARE EDUCATION/TRAINING PROGRAM

## 2024-04-16 PROCEDURE — 87636 SARSCOV2 & INF A&B AMP PRB: CPT | Performed by: STUDENT IN AN ORGANIZED HEALTH CARE EDUCATION/TRAINING PROGRAM

## 2024-04-16 PROCEDURE — 84075 ASSAY ALKALINE PHOSPHATASE: CPT | Performed by: STUDENT IN AN ORGANIZED HEALTH CARE EDUCATION/TRAINING PROGRAM

## 2024-04-16 PROCEDURE — 51701 INSERT BLADDER CATHETER: CPT

## 2024-04-16 PROCEDURE — 2020000001 HC ICU ROOM DAILY

## 2024-04-16 PROCEDURE — 80143 DRUG ASSAY ACETAMINOPHEN: CPT | Performed by: STUDENT IN AN ORGANIZED HEALTH CARE EDUCATION/TRAINING PROGRAM

## 2024-04-16 PROCEDURE — 2500000004 HC RX 250 GENERAL PHARMACY W/ HCPCS (ALT 636 FOR OP/ED): Performed by: CLINICAL NURSE SPECIALIST

## 2024-04-16 PROCEDURE — 2500000005 HC RX 250 GENERAL PHARMACY W/O HCPCS

## 2024-04-16 PROCEDURE — 99291 CRITICAL CARE FIRST HOUR: CPT

## 2024-04-16 PROCEDURE — 99223 1ST HOSP IP/OBS HIGH 75: CPT

## 2024-04-16 PROCEDURE — 81025 URINE PREGNANCY TEST: CPT

## 2024-04-16 PROCEDURE — 93005 ELECTROCARDIOGRAM TRACING: CPT

## 2024-04-16 PROCEDURE — 80307 DRUG TEST PRSMV CHEM ANLYZR: CPT

## 2024-04-16 PROCEDURE — 2500000004 HC RX 250 GENERAL PHARMACY W/ HCPCS (ALT 636 FOR OP/ED): Performed by: STUDENT IN AN ORGANIZED HEALTH CARE EDUCATION/TRAINING PROGRAM

## 2024-04-16 PROCEDURE — 99291 CRITICAL CARE FIRST HOUR: CPT | Performed by: CLINICAL NURSE SPECIALIST

## 2024-04-16 RX ORDER — NALOXONE HYDROCHLORIDE 1 MG/ML
2 INJECTION INTRAMUSCULAR; INTRAVENOUS; SUBCUTANEOUS ONCE
Status: COMPLETED | OUTPATIENT
Start: 2024-04-16 | End: 2024-04-16

## 2024-04-16 RX ORDER — CLONAZEPAM 0.5 MG/1
0.5 TABLET ORAL DAILY
COMMUNITY
End: 2024-04-18 | Stop reason: HOSPADM

## 2024-04-16 RX ORDER — NALOXONE HYDROCHLORIDE 1 MG/ML
2 INJECTION INTRAMUSCULAR; INTRAVENOUS; SUBCUTANEOUS ONCE
Status: DISCONTINUED | OUTPATIENT
Start: 2024-04-16 | End: 2024-04-16

## 2024-04-16 RX ORDER — VENLAFAXINE HYDROCHLORIDE 75 MG/1
75 CAPSULE, EXTENDED RELEASE ORAL DAILY
COMMUNITY
End: 2024-04-18 | Stop reason: HOSPADM

## 2024-04-16 RX ORDER — NALOXONE HYDROCHLORIDE 0.4 MG/ML
INJECTION, SOLUTION INTRAMUSCULAR; INTRAVENOUS; SUBCUTANEOUS
Status: COMPLETED
Start: 2024-04-16 | End: 2024-04-16

## 2024-04-16 RX ORDER — NALOXONE HYDROCHLORIDE 0.4 MG/ML
0.4 INJECTION, SOLUTION INTRAMUSCULAR; INTRAVENOUS; SUBCUTANEOUS ONCE
Status: COMPLETED | OUTPATIENT
Start: 2024-04-16 | End: 2024-04-16

## 2024-04-16 RX ORDER — NALOXONE HYDROCHLORIDE 1 MG/ML
2 INJECTION INTRAMUSCULAR; INTRAVENOUS; SUBCUTANEOUS AS NEEDED
Status: DISCONTINUED | OUTPATIENT
Start: 2024-04-16 | End: 2024-04-18 | Stop reason: HOSPADM

## 2024-04-16 RX ORDER — NALOXONE HYDROCHLORIDE 0.4 MG/ML
0.4 INJECTION, SOLUTION INTRAMUSCULAR; INTRAVENOUS; SUBCUTANEOUS AS NEEDED
Status: DISCONTINUED | OUTPATIENT
Start: 2024-04-16 | End: 2024-04-18 | Stop reason: HOSPADM

## 2024-04-16 RX ORDER — NALOXONE HYDROCHLORIDE 0.4 MG/ML
0.8 INJECTION, SOLUTION INTRAMUSCULAR; INTRAVENOUS; SUBCUTANEOUS AS NEEDED
Status: CANCELLED | OUTPATIENT
Start: 2024-04-16

## 2024-04-16 RX ORDER — NALOXONE HYDROCHLORIDE 0.4 MG/ML
INJECTION, SOLUTION INTRAMUSCULAR; INTRAVENOUS; SUBCUTANEOUS AS NEEDED
Status: CANCELLED | OUTPATIENT
Start: 2024-04-16

## 2024-04-16 RX ORDER — SODIUM CHLORIDE, SODIUM LACTATE, POTASSIUM CHLORIDE, CALCIUM CHLORIDE 600; 310; 30; 20 MG/100ML; MG/100ML; MG/100ML; MG/100ML
50 INJECTION, SOLUTION INTRAVENOUS CONTINUOUS
Status: DISCONTINUED | OUTPATIENT
Start: 2024-04-16 | End: 2024-04-17

## 2024-04-16 RX ADMIN — NALOXONE HYDROCHLORIDE 3 MG/HR: 1 INJECTION PARENTERAL at 22:10

## 2024-04-16 RX ADMIN — NALOXONE HYDROCHLORIDE 0.4 MG: 0.4 INJECTION, SOLUTION INTRAMUSCULAR; INTRAVENOUS; SUBCUTANEOUS at 20:27

## 2024-04-16 RX ADMIN — SODIUM CHLORIDE, SODIUM LACTATE, POTASSIUM CHLORIDE, AND CALCIUM CHLORIDE 50 ML/HR: 600; 310; 30; 20 INJECTION, SOLUTION INTRAVENOUS at 14:25

## 2024-04-16 RX ADMIN — NALOXONE HYDROCHLORIDE 3 MG/HR: 1 INJECTION PARENTERAL at 23:54

## 2024-04-16 RX ADMIN — SODIUM CHLORIDE 1000 ML: 900 INJECTION, SOLUTION INTRAVENOUS at 13:40

## 2024-04-16 RX ADMIN — NALOXONE HYDROCHLORIDE 1 MG/HR: 1 INJECTION PARENTERAL at 20:07

## 2024-04-16 RX ADMIN — Medication 2 L/MIN: at 20:00

## 2024-04-16 RX ADMIN — NALOXONE HYDROCHLORIDE 2 MG: 1 INJECTION PARENTERAL at 11:30

## 2024-04-16 RX ADMIN — NALOXONE HYDROCHLORIDE 0.4 MG: 0.4 INJECTION, SOLUTION INTRAMUSCULAR; INTRAVENOUS; SUBCUTANEOUS at 20:11

## 2024-04-16 RX ADMIN — NALOXONE HYDROCHLORIDE 2 MG: 1 INJECTION PARENTERAL at 12:45

## 2024-04-16 RX ADMIN — NALOXONE HYDROCHLORIDE 0.4 MG: 0.4 INJECTION, SOLUTION INTRAMUSCULAR; INTRAVENOUS; SUBCUTANEOUS at 23:53

## 2024-04-16 RX ADMIN — Medication: at 14:30

## 2024-04-16 RX ADMIN — NALOXONE HYDROCHLORIDE 2 MG: 1 INJECTION PARENTERAL at 13:05

## 2024-04-16 RX ADMIN — NALOXONE HYDROCHLORIDE 2 MCG/KG/HR: 1 INJECTION PARENTERAL at 13:39

## 2024-04-16 SDOH — SOCIAL STABILITY: SOCIAL INSECURITY: DO YOU FEEL ANYONE HAS EXPLOITED OR TAKEN ADVANTAGE OF YOU FINANCIALLY OR OF YOUR PERSONAL PROPERTY?: NO

## 2024-04-16 SDOH — SOCIAL STABILITY: SOCIAL INSECURITY: WERE YOU ABLE TO COMPLETE ALL THE BEHAVIORAL HEALTH SCREENINGS?: YES

## 2024-04-16 SDOH — SOCIAL STABILITY: SOCIAL INSECURITY: ABUSE: ADULT

## 2024-04-16 SDOH — SOCIAL STABILITY: SOCIAL INSECURITY: HAVE YOU HAD ANY THOUGHTS OF HARMING ANYONE ELSE?: NO

## 2024-04-16 SDOH — SOCIAL STABILITY: SOCIAL INSECURITY: DOES ANYONE TRY TO KEEP YOU FROM HAVING/CONTACTING OTHER FRIENDS OR DOING THINGS OUTSIDE YOUR HOME?: NO

## 2024-04-16 SDOH — SOCIAL STABILITY: SOCIAL INSECURITY: DO YOU FEEL UNSAFE GOING BACK TO THE PLACE WHERE YOU ARE LIVING?: NO

## 2024-04-16 SDOH — SOCIAL STABILITY: SOCIAL INSECURITY: ARE THERE ANY APPARENT SIGNS OF INJURIES/BEHAVIORS THAT COULD BE RELATED TO ABUSE/NEGLECT?: NO

## 2024-04-16 SDOH — SOCIAL STABILITY: SOCIAL INSECURITY: HAVE YOU HAD THOUGHTS OF HARMING ANYONE ELSE?: NO

## 2024-04-16 SDOH — SOCIAL STABILITY: SOCIAL INSECURITY: HAS ANYONE EVER THREATENED TO HURT YOUR FAMILY OR YOUR PETS?: NO

## 2024-04-16 SDOH — SOCIAL STABILITY: SOCIAL INSECURITY: ARE YOU OR HAVE YOU BEEN THREATENED OR ABUSED PHYSICALLY, EMOTIONALLY, OR SEXUALLY BY ANYONE?: NO

## 2024-04-16 ASSESSMENT — COGNITIVE AND FUNCTIONAL STATUS - GENERAL
MOBILITY SCORE: 24
PATIENT BASELINE BEDBOUND: NO
DAILY ACTIVITIY SCORE: 24

## 2024-04-16 ASSESSMENT — ACTIVITIES OF DAILY LIVING (ADL)
BATHING: INDEPENDENT
PATIENT'S MEMORY ADEQUATE TO SAFELY COMPLETE DAILY ACTIVITIES?: YES
DRESSING YOURSELF: INDEPENDENT
ADEQUATE_TO_COMPLETE_ADL: YES
HEARING - LEFT EAR: FUNCTIONAL
HEARING - RIGHT EAR: FUNCTIONAL
LACK_OF_TRANSPORTATION: NO
GROOMING: INDEPENDENT
FEEDING YOURSELF: INDEPENDENT
JUDGMENT_ADEQUATE_SAFELY_COMPLETE_DAILY_ACTIVITIES: YES
WALKS IN HOME: INDEPENDENT
TOILETING: INDEPENDENT

## 2024-04-16 ASSESSMENT — LIFESTYLE VARIABLES
HOW OFTEN DURING THE LAST YEAR HAVE YOU NEEDED AN ALCOHOLIC DRINK FIRST THING IN THE MORNING TO GET YOURSELF GOING AFTER A NIGHT OF HEAVY DRINKING: MONTHLY
SUBSTANCE_ABUSE_PAST_12_MONTHS: YES
AUDIT TOTAL SCORE: 20
HOW OFTEN DURING THE LAST YEAR HAVE YOU HAD A FEELING OF GUILT OR REMORSE AFTER DRINKING: MONTHLY
HOW MANY STANDARD DRINKS CONTAINING ALCOHOL DO YOU HAVE ON A TYPICAL DAY: 3 OR 4
HOW OFTEN DO YOU HAVE 6 OR MORE DRINKS ON ONE OCCASION: MONTHLY
AUDIT-C TOTAL SCORE: 4
AUDIT TOTAL SCORE: 16
HOW OFTEN DURING THE LAST YEAR HAVE YOU FOUND THAT YOU WERE NOT ABLE TO STOP DRINKING ONCE YOU HAD STARTED: MONTHLY
HOW OFTEN DURING THE LAST YEAR HAVE YOU FAILED TO DO WHAT WAS NORMALLY EXPECTED FROM YOU BECAUSE OF DRINKING: MONTHLY
AUDIT-C TOTAL SCORE: 4
PRESCIPTION_ABUSE_PAST_12_MONTHS: YES
SKIP TO QUESTIONS 9-10: 0
HOW OFTEN DURING THE LAST YEAR HAVE YOU BEEN UNABLE TO REMEMBER WHAT HAPPENED THE NIGHT BEFORE BECAUSE YOU HAD BEEN DRINKING: MONTHLY
HAVE YOU OR SOMEONE ELSE BEEN INJURED AS A RESULT OF YOUR DRINKING: YES, BUT NOT IN THE LAST YEAR
HAS A RELATIVE, FRIEND, DOCTOR, OR ANOTHER HEALTH PROFESSIONAL EXPRESSED CONCERN ABOUT YOUR DRINKING OR SUGGESTED YOU CUT DOWN: YES, DURING THE LAST YEAR
HOW OFTEN DO YOU HAVE A DRINK CONTAINING ALCOHOL: MONTHLY OR LESS

## 2024-04-16 ASSESSMENT — PAIN SCALES - GENERAL
PAINLEVEL_OUTOF10: 0 - NO PAIN

## 2024-04-16 ASSESSMENT — PATIENT HEALTH QUESTIONNAIRE - PHQ9
SUM OF ALL RESPONSES TO PHQ9 QUESTIONS 1 & 2: 2
2. FEELING DOWN, DEPRESSED OR HOPELESS: SEVERAL DAYS
1. LITTLE INTEREST OR PLEASURE IN DOING THINGS: SEVERAL DAYS

## 2024-04-16 ASSESSMENT — COLUMBIA-SUICIDE SEVERITY RATING SCALE - C-SSRS
6. HAVE YOU EVER DONE ANYTHING, STARTED TO DO ANYTHING, OR PREPARED TO DO ANYTHING TO END YOUR LIFE?: YES
5. HAVE YOU STARTED TO WORK OUT OR WORKED OUT THE DETAILS OF HOW TO KILL YOURSELF? DO YOU INTEND TO CARRY OUT THIS PLAN?: YES
5. HAVE YOU STARTED TO WORK OUT OR WORKED OUT THE DETAILS OF HOW TO KILL YOURSELF? DO YOU INTEND TO CARRY OUT THIS PLAN?: YES
6. HAVE YOU EVER DONE ANYTHING, STARTED TO DO ANYTHING, OR PREPARED TO DO ANYTHING TO END YOUR LIFE?: YES
6. HAVE YOU EVER DONE ANYTHING, STARTED TO DO ANYTHING, OR PREPARED TO DO ANYTHING TO END YOUR LIFE?: YES
1. IN THE PAST MONTH, HAVE YOU WISHED YOU WERE DEAD OR WISHED YOU COULD GO TO SLEEP AND NOT WAKE UP?: YES
2. HAVE YOU ACTUALLY HAD ANY THOUGHTS OF KILLING YOURSELF?: YES
4. HAVE YOU HAD THESE THOUGHTS AND HAD SOME INTENTION OF ACTING ON THEM?: YES
4. HAVE YOU HAD THESE THOUGHTS AND HAD SOME INTENTION OF ACTING ON THEM?: YES
6. HAVE YOU EVER DONE ANYTHING, STARTED TO DO ANYTHING, OR PREPARED TO DO ANYTHING TO END YOUR LIFE?: YES

## 2024-04-16 ASSESSMENT — PAIN - FUNCTIONAL ASSESSMENT
PAIN_FUNCTIONAL_ASSESSMENT: 0-10

## 2024-04-16 NOTE — ED PROVIDER NOTES
"Department of Emergency Medicine   ED  Provider Note  Admit Date/RoomTime: 4/16/2024 11:33 AM  ED Room: 19/19-A        History of Present Illness:  Chief Complaint   Patient presents with    Drug Overdose    Suicide Attempt         Tete Kirk is a 23 y.o. female presenting to the ED for intentional overdose to commit suicide.  Patient has history of suicide attempts.  Most recent hospitalization in February 2024.  Was admitted till 8 when EMS was called for intentional fentanyl overdose.  When removing her from the reported stopped breathing and received 1 dose of Narcan prior to arrival.  Patient states she took about 95 fentanyl pills by ingestion.  However police report that there was baggies of other white substances on the table unsure of what it was and also powder with a rolled up bel decided.  Patient does have tartar on the right side of her nose.  Patient is very tearful.  Reports she does not want to die reports why can I just die.,  Denies cough congestion runny nose sore throat no abdominal pain no nausea no vomiting denies pregnancy.  Very tearful.  Review of Systems:   Pertinent positives and negatives are stated within HPI, all other systems reviewed and are negative.        --------------------------------------------- PAST HISTORY ---------------------------------------------  Past Medical History:  has a past medical history of Depression, Suicidal ideation, and Suicide attempt (Multi).  Past Surgical History:  has a past surgical history that includes Other surgical history (01/03/2023).  Social History:  reports that she has quit smoking. Her smoking use included cigarettes. She has never used smokeless tobacco. She reports current alcohol use. She reports current drug use. Drugs: Fentanyl, \"Crack\" cocaine, Cocaine, and Amphetamines.  Family History: family history is not on file.. Unless otherwise noted, family history is non contributory  The patient’s home medications have been " "reviewed.  Allergies: Patient has no known allergies.        ---------------------------------------------------PHYSICAL EXAM--------------------------------------    GENERAL APPEARANCE: Awake and alert.   Psych: Crying.  Stating she wishes she could just die.  Reports this was a suicide attempt  VITAL SIGNS: As per the nurses' triage record.  Elevated blood pressure  HEENT: Normocephalic, atraumatic.  No raccoon eyes or case signs noted.  No epistaxis noted.  No bite to the tongue or lip.  White powder noted to the right nostril.  Extraocular muscles are intact. Pupils equal round and reactive to light. Conjunctiva are pink. Negative scleral icterus. Mucous membranes are moist. Tongue in the midline. Pharynx was without erythema or exudates, uvula midline  NECK: Soft Nontender and supple, full gross ROM, no meningeal signs.  No pain palpation of the cervical spine no step-offs crepitus or bruising  CHEST: Nontender to palpation. Clear to auscultation bilaterally. No rales, rhonchi, or wheezing.   HEART: S1, S2. Regular rate and rhythm. No murmurs, gallops or rubs.  Strong and equal pulses in the extremities.   ABDOMEN: Soft, nontender, nondistended, positive bowel sounds, no palpable masses.  MUSCULCSKELETAL: Moving all extremities strong x 4.  Sensation intact.  Peripheral pulses intact.  No cyanosis noted  NEUROLOGICAL: Awake, alert and oriented x 3. Power intact in the upper and lower extremities. Sensation is intact to light touch in the upper and lower extremities.   IMMUNOLOGICAL: No lymphatic streaking noted   DERM: No petechiae, rashes, or ecchymoses.          ------------------------- NURSING NOTES AND VITALS REVIEWED ---------------------------  The nursing notes within the ED encounter and vital signs as below have been reviewed by myself  BP 98/56   Pulse 88   Temp 36.6 °C (97.9 °F) (Oral)   Resp 12   Ht 1.575 m (5' 2\")   Wt 49.7 kg (109 lb 9.1 oz)   LMP 03/29/2024 (Within Days)   SpO2 100%   " BMI 20.04 kg/m²     Oxygen Saturation Interpretation: 100% room air    The cardiac monitor revealed sinus rhythm with a heart rate in the 80s as interpreted by me. The cardiac monitor was ordered secondary to the patient's heart rate and to monitor the patient for dysrhythmia.       The patient’s available past medical records and past encounters were reviewed.          -----------------------DIAGNOSTIC RESULTS------------------------  LABS:    Labs Reviewed   COMPREHENSIVE METABOLIC PANEL - Abnormal       Result Value    Glucose 94      Sodium 136      Potassium 3.2 (*)     Chloride 99      Bicarbonate 23 (*)     Urea Nitrogen 6 (*)     Creatinine 0.60      eGFR >90      Calcium 9.3      Albumin 4.6      Alkaline Phosphatase 57      Total Protein 7.5      AST 20      Bilirubin, Total 0.7      ALT 10      Anion Gap 14     ACUTE TOXICOLOGY PANEL, BLOOD - Normal    Acetaminophen <5.0      Salicylate  <0      Alcohol <0.010     SARS-COV-2 AND INFLUENZA A/B PCR - Normal    Flu A Result Not Detected      Flu B Result Not Detected      Coronavirus 2019, PCR Not Detected      Narrative:     This assay has received FDA Emergency Use Authorization (EUA) and  is only authorized for the duration of time that circumstances exist to justify the authorization of the emergency use of in vitro diagnostic tests for the detection of SARS-CoV-2 virus and/or diagnosis of COVID-19 infection under section 564(b)(1) of the Act, 21 U.S.C. 360bbb-3(b)(1). Testing for SARS-CoV-2 is only recommended for patients who meet current clinical and/or epidemiological criteria as defined by federal, state, or local public health directives. This assay is an in vitro diagnostic nucleic acid amplification test for the qualitative detection of SARS-CoV-2, Influenza A, and Influenza B from nasopharyngeal specimens and has been validated for use at [facility]. Negative results do not preclude COVID-19 infections or Influenza A/B  infections, and should not be used as the sole basis for diagnosis, treatment, or other management decisions. If Influenza A/B and RSV PCR results are negative, testing for Parainfluenza virus, Adenovirus and Metapneumovirus is routinely performed for [facility] [place] and intensive care inpatients, and is available on other patients by placing an add-on request.    CBC WITH AUTO DIFFERENTIAL    WBC 7.5      nRBC 0.0      RBC 4.55      Hemoglobin 12.7      Hematocrit 39.0      MCV 86      MCH 27.9      MCHC 32.6      RDW 13.3      Platelets 231      Neutrophils % 68.1      Immature Granulocytes %, Automated 0.1      Lymphocytes % 21.3      Monocytes % 9.1      Eosinophils % 0.9      Basophils % 0.5      Neutrophils Absolute 5.07      Immature Granulocytes Absolute, Automated 0.01      Lymphocytes Absolute 1.59      Monocytes Absolute 0.68      Eosinophils Absolute 0.07      Basophils Absolute 0.04     DRUG SCREEN,URINE   HCG, URINE, QUALITATIVE   AMPHETAMINE, BLOOD, QUANTITATIVE   COCAINE, BLOOD, QUANTITATIVE   FENTANYL CONFIRMATION, BLOOD       As interpreted by me, the above displayed labs are abnormal. All other labs obtained during this visit were within normal range or not returned as of this dictation.      EKG Interpretation    1145 EKG Time:1142  EKG Interpretation time:1145  EKG Interpretation: EKG shows normal sinus rhythm with a rate of 88 bpm, normal axis, QTc 467, nonspecific ST changes but no evidence of STEMI.    EKG was interpreted by myself independently [JL]   Per Attending Note         No orders to display           No orders to display           ------------------------------ ED COURSE/MEDICAL DECISION MAKING----------------------  Medical Decision Making:   Exam: A medically appropriate exam performed, outlined above, given the known history and presentation.    History obtained from: Medical record nursing notes patient EMS report police      Social Determinants of Health considered during  this visit: Patient was found in a Pending sale to [facility]      PAST MEDICAL HISTORY/Chronic Conditions Affecting Care     has a past medical history of Depression, Suicidal ideation, and Suicide attempt (Multi).       CC/HPI Summary, Social Determinants of health, Records Reviewed, DDx, testing done/not done, ED Course, Reassessment, disposition considerations/shared decision making with patient, consults, disposition:   Presents with suicide attempt by overdose unknown substances reported fentanyl requiring Narcan  Plan  Suicide precautions  EKG  Talk screen  CBC  CMP  Drug screen  Pregnancy  COVID flu  Normal saline    Medical Decision Making/Differential Diagnosis:  Differentials include not limited to patient presented complaint suicide attempt by taking multiple fentanyl pills.  Patient will need to be monitored for anoxic injury secondary to fentanyl versus cardiopulmonary  Review:  COVID flu negative potassium 3.2 otherwise electrolytes unremarkable  Bicarb 23  BUN 6  Creatinine 0.6  LFTs within normal limits  Salicylate level negative  Acetaminophen level negative  Alcohol negative  White blood cell count 7.5  Hemoglobin 12.7  Urine pending upon admission  Patient presented to the emergency department with intentional overdose to commit suicide.  Potassium noted to be slightly low otherwise electrolytes unremarkable.  Patient is not anemic no elevation white blood cell count.  LFTs within normal limits normal renal function COVID flu negative.  Electrolyte imbalance noted.  Alcohol, acetaminophen, salicylate level normal.  Drug screen pending upon admission as well as urine pregnancy.  Patient presented after being found as an overdose at Pending sale to [facility] requiring Narcan administration due to respiratory arrest.  No CPR was initiated.  Upon arrival patient was tearful but interactive.  Shortly after arrival required additional Narcan secondary to acute respiratory distress with hypoxia and cyanosis.  Symptoms improved with Narcan  but then reported second dose of Narcan.  Discussed case with the ICU team who came down and evaluated the patient after evaluation with the patient was placed on Narcan drip prior to transfer to the ICU requiring additional Narcan bolus.  Based on patient's clinical presentation history and symptoms consistent with acute respiratory distress secondary to reported fentanyl overdose possibly other drugs that were noted to be at the bedside concern for amphetamines crack cocaine.  Patient required multiple interventions as well as continued monitoring.  Patient seen and evaluated with attending physician Dr. Mendoza  Attending physician who spoke with poison control advised 4-6-hour monitoring after initial Narcan dosing, patient had required multiple Narcan doses interaction decision was made to admit under ICU level of care for further monitoring    PROCEDURES  Unless otherwise noted below, none      CONSULTS:   IP CONSULT TO PSYCHIATRY    Poison control consulted by attending physician.  Advised to monitor for 6 hours after dose of Narcan  ED Course as of 04/16/24 1651 Tue Apr 16, 2024   1145 EKG Time:1142  EKG Interpretation time:1145  EKG Interpretation: EKG shows normal sinus rhythm with a rate of 88 bpm, normal axis, QTc 467, nonspecific ST changes but no evidence of STEMI.    EKG was interpreted by myself independently [JL]   1223 Unresponsiveness hypoxia tachycardia.  Requiring a additional dose of Narcan with return of consciousness pulse ox improved heart rate improved.  Notified ICU for admission after fentanyl overdose [TB]   1244 ICU notified patient requiring additional dose of Narcan due to cyanosis decreased respirations increased heart rate.  After the 2 mg of Narcan vital signs have improved patient is more alert. [TB]   1256 ICU team down to evaluate patient [TB]   1404 Patient being transferred to the ICU was having periods of apnea additional bolus of Narcan given [TB]      ED Course User  Index  [JL] Terrance Mendoza DO  [TB] SUSANA Florez         Diagnoses as of 04/16/24 1651   Intentional drug overdose, initial encounter (Multi)   Acute respiratory failure with hypoxia (Multi)   Suicide attempt (Multi)         This patient has remained hemodynamically stable during their ED course.      Critical Care: 40  Critical Care    Performed by: SUSANA Florez  Authorized by: Terrance Mendoza DO    Critical care provider statement:     Critical care time (minutes):  40    Critical care time was exclusive of:  Separately billable procedures and treating other patients and teaching time    Critical care was necessary to treat or prevent imminent or life-threatening deterioration of the following conditions:  Toxidrome and respiratory failure    Critical care was time spent personally by me on the following activities:  Blood draw for specimens, development of treatment plan with patient or surrogate, discussions with consultants, discussions with primary provider, evaluation of patient's response to treatment, examination of patient, interpretation of cardiac output measurements, obtaining history from patient or surrogate, ordering and performing treatments and interventions, ordering and review of laboratory studies, ordering and review of radiographic studies, pulse oximetry, re-evaluation of patient's condition and review of old charts    Care discussed with: admitting provider      Requiring multiple interventions for respiratory failure secondary to opiate overdose requiring Narcan close monitoring ICU level care admission      Counseling:  The emergency provider has spoken with the patient family and discussed today’s results, in addition to providing specific details for the plan of care and counseling regarding the diagnosis and prognosis.  Questions are answered at this time and they are agreeable with the plan.         --------------------------------- IMPRESSION AND DISPOSITION  ---------------------------------    IMPRESSION  1. Intentional drug overdose, initial encounter (Multi)    2. Acute respiratory failure with hypoxia (Multi)    3. Suicide attempt (Multi)        DISPOSITION  Disposition: Admit ICU  Patient condition is critical        NOTE: This report was transcribed using voice recognition software. Every effort was made to ensure accuracy; however, inadvertent computerized transcription errors may be present      Betzaida Brown, ALVARADO-NEGAR  04/16/24 9906

## 2024-04-16 NOTE — H&P
"[facility] Critical Care Medicine       Date:  4/16/2024  Patient:  Tete Kirk  YOB: 2000  MRN:  [id number]   Admit Date:  4/16/2024    Chief Complaint   Patient presents with    Drug Overdose    Suicide Attempt     History of Present Illness:  Tete Kirk is a 23 y.o. year old female patient with Past Medical History of depression, suicidal ideation and suicide attempt in 2/2024 who presented to ER today via EMS after being found in a motel room after suspected drug overdose. Upon EMS arrival, patient was agonal breathing and treated with intranasal narcan, she remained drowsy with periods of apnea noted in the ER where she was treated with an additional two doses of narcan. Per ER, patient reports to have ingested 95 fentanyl pills, and was found with white powder surrounding external nares indicative of amphetamines. Due to patient frequent narcan dosing and continuing periods of drowsiness with concern for airway protection, ICU consulted and accepted for admission.    ER course: Resp viral panel negative, CBC negative, CMP shows K 3.2. EKG shows NSR 88bpm, no ST/T wave abnormalities, QTc prolonged 467. UDS, HCG pending.      Interval ICU Events:  4/16: Admitted to ICU, narcan infusion initiated s/p multiple intermittent narcan doses in EMS/ER. Patient is arousable, drowsy, states she just wants to \"sleep and die\". Mom at bedside, discussed treatment plan and possible need for intubation if airway compromise is suspected. Per mom, patient is actively in outpatient therapy at [facility], she sees a psychologist and psychiatrist regularly, she takes antidepressants but she is unsure of the name but the dosage was just increased. She also states Tete has been attending AA/NA meetings, in the ER today she told her mom that she pretends to be okay but everyday is a fight for her and it's too much. Sitter at bedside, suicide precautions in place.    Medical History:  Past Medical History: " "  Diagnosis Date    Depression     Suicidal ideation     Suicide attempt (Multi)      Past Surgical History:   Procedure Laterality Date    OTHER SURGICAL HISTORY  01/03/2023    Tonsillectomy     (Not in a hospital admission)    Patient has no known allergies.  Social History     Tobacco Use    Smoking status: Unknown   Vaping Use    Vaping status: Unknown   Substance Use Topics    Alcohol use: Defer    Drug use: Yes     Types: Fentanyl     No family history on file.    Review of Systems:  14 point review of systems was completed and negative except for those specially mention in my HPI    Physical Exam:    Heart Rate:  [84]   Temperature:  [36.8 °C (98.2 °F)]   Respirations:  [15]   BP: (129)/(96)   Height:  [157.5 cm (5' 2\")]   Weight:  [52.2 kg (115 lb)]   Pulse Ox:  [100 %]     Physical Exam  Vitals and nursing note reviewed.   Constitutional:       General: She is not in acute distress.  HENT:      Mouth/Throat:      Mouth: Mucous membranes are dry.      Pharynx: Oropharynx is clear. Posterior oropharyngeal erythema present.   Eyes:      Extraocular Movements: Extraocular movements intact.      Pupils: Pupils are equal, round, and reactive to light.   Cardiovascular:      Rate and Rhythm: Regular rhythm. Tachycardia present.      Pulses: Normal pulses.      Heart sounds: Normal heart sounds.   Pulmonary:      Effort: Pulmonary effort is normal.      Breath sounds: Normal breath sounds.   Abdominal:      General: Abdomen is flat. Bowel sounds are normal.      Palpations: Abdomen is soft.   Musculoskeletal:         General: Normal range of motion.      Right lower leg: No edema.      Left lower leg: No edema.   Skin:     General: Skin is warm and dry.      Capillary Refill: Capillary refill takes less than 2 seconds.   Neurological:      Mental Status: She is oriented to person, place, and time and easily aroused. She is lethargic.   Psychiatric:         Thought Content: Thought content includes suicidal " ideation.         Objective:    I have reviewed all medications, laboratory results, and imaging pertinent for today's encounter    Assessment/Plan:    I am currently managing this critically ill patient for the following problems:    Neuro/Psych/Pain Ctrl/Sedation:  Intentional drug overdose  Hx suicidal ideation with suicide attempt 2/2024  Hx Depression  - UDS pending  - ETOH negative  - Narcan infusion initiated, titrate to RR >8  - Home med: Lexapro per previous admission  - Maintain suicide/elopement precautions  - Psych consult: suicide attempt, hx of previous attempt, evaluation for IP psych    Respiratory/ENT:  Acute hypoxic respiratory failure in the setting of drug overdose  - O2 supplementation as needed for SpO2 >92%  - O2 delivered via basic face mask  - Low threshold for intubation if apnea, desaturation occurs    Cardiovascular:  Prolonged QTc  - Daily EKG  - Continuous telemetry    GI:  - NPO until more alert  - Bedside swallow prior to PO intake  - Advance to regular diet as tolerated    Renal/Volume Status (Intra & Extravascular):  - Monitor UOP, maintain >0.5mL/kg/hr  - May need BS/straight cath for urine studies  - Daily CMP, Mg    Endocrine  - POCT glucose Q4 while NPO    Infectious Disease:  No concern for active infection  - Monitor for SIRS  - Daily CBC    Heme/Onc:  No active issues    OBGYN/MSK:  -HCG pending  - Bedrest while on narcan infusion  - Activity as tolerated    Ethics/Code Status:  Full Code  Update given to Kendra wolfe at bedside    :  DVT Prophylaxis: Low risk, SCDs  GI Prophylaxis: None  Bowel Regimen: None  Diet: NPO  CVC: None  [place]: None  [place]: None  Restraints: None  Dispo: ICU, narcan infusion    Critical Care Time:  60 minutes spent in preparing to see patient (I.e. review of medical records), evaluation of diagnostics (I.e. labs, imaging, etc.), documentation, discussing plan of care with patient/ family/ caregiver, and/ or coordination of care with  multidisciplinary team. Time does not include completion of procedure time.      Yanci De Souza APRN-CNP CCRN  Pulmonology & Critical Care Medicine  [facility]

## 2024-04-16 NOTE — ED NOTES
Pt oxygen alarming medica and nurse to bedside. Pt oxygen low and unresponsive. 2mg narcan given IV per verbal order of Dr. Mendoza, pt now awake. Oxygen 100%     Francoise Tipton RN  04/16/24 3603

## 2024-04-16 NOTE — ED TRIAGE NOTES
[place] [facility] dispatched for an intentional over dose/suicide, hx of suicide attempt in feb 2024 by CO poisoning. Aox4 and responsive upon arrival, pt became unresponsive for ems in the residence, [facility] x 1.  Patient admitted to approx 96 fentanyl pill ingestion, and unknown white powder snorted (meth , crack, speed, unknown). Aox4 upon arrival to ER. 4mg zofran IV by ems as a precaution.

## 2024-04-16 NOTE — ED PROVIDER NOTES
HPI   Chief Complaint   Patient presents with    Drug Overdose    Suicide Attempt       Patient is a 23-year-old female that presents emergency department for evaluation of intentional overdose.  Patient admits to taking 96 fentanyl tablets in an attempt to commit suicide and overdose.  She also snorted a white substance which was believed to be amphetamines.  Patient reportedly was doing okay and then became less responsive at home and patient's mother called EMS to come evaluate her.  Patient was somnolent when EMS arrived and they did give her dose of Narcan with improvement of her mentation.  Patient does admit that this was an attempt to commit suicide and has attempted to commit suicide several months ago by asphyxiation with CO2.                          No data recorded                   Patient History   No past medical history on file.  Past Surgical History:   Procedure Laterality Date    OTHER SURGICAL HISTORY  01/03/2023    Tonsillectomy     No family history on file.  Social History     Tobacco Use    Smoking status: Not on file    Smokeless tobacco: Not on file   Substance Use Topics    Alcohol use: Not on file    Drug use: Not on file       Physical Exam   ED Triage Vitals [04/16/24 1138]   Temperature Heart Rate Respirations BP   36.8 °C (98.2 °F) 84 15 (!) 129/96      Pulse Ox Temp Source Heart Rate Source Patient Position   100 % Temporal -- --      BP Location FiO2 (%)     -- --       Physical Exam  Vitals and nursing note reviewed.   HENT:      Head: Normocephalic.      Mouth/Throat:      Mouth: Mucous membranes are moist.   Eyes:      Extraocular Movements: Extraocular movements intact.      Comments: Pupils equal although constricted and reactive to light   Cardiovascular:      Rate and Rhythm: Normal rate and regular rhythm.   Pulmonary:      Effort: Pulmonary effort is normal.   Abdominal:      General: Abdomen is flat.      Tenderness: There is no abdominal tenderness. There is no guarding  or rebound.   Neurological:      General: No focal deficit present.      Mental Status: She is alert.   Psychiatric:         Attention and Perception: Attention normal.         Mood and Affect: Affect is tearful.         Thought Content: Thought content includes suicidal ideation. Thought content includes suicidal plan.         Judgment: Judgment is impulsive.       Recent Results (from the past 24 hour(s))   Electrocardiogram, 12-lead    Collection Time: 04/16/24 11:58 AM   Result Value Ref Range    Ventricular Rate 88 BPM    Atrial Rate 88 BPM    WY Interval 118 ms    QRS Duration 88 ms    QT Interval 386 ms    QTC Calculation(Bazett) 467 ms    P Axis 77 degrees    R Axis 68 degrees    T Axis 24 degrees    QRS Count 14 beats    Q Onset 218 ms    P Onset 159 ms    P Offset 201 ms    T Offset 411 ms    QTC Fredericia 438 ms   CBC and Auto Differential    Collection Time: 04/16/24 12:00 PM   Result Value Ref Range    WBC 7.5 4.4 - 11.3 x10*3/uL    nRBC 0.0 0.0 - 0.0 /100 WBCs    RBC 4.55 4.00 - 5.20 x10*6/uL    Hemoglobin 12.7 12.0 - 16.0 g/dL    Hematocrit 39.0 36.0 - 46.0 %    MCV 86 80 - 100 fL    MCH 27.9 26.0 - 34.0 pg    MCHC 32.6 32.0 - 36.0 g/dL    RDW 13.3 11.5 - 14.5 %    Platelets 231 150 - 450 x10*3/uL    Neutrophils % 68.1 40.0 - 80.0 %    Immature Granulocytes %, Automated 0.1 0.0 - 0.9 %    Lymphocytes % 21.3 13.0 - 44.0 %    Monocytes % 9.1 2.0 - 10.0 %    Eosinophils % 0.9 0.0 - 6.0 %    Basophils % 0.5 0.0 - 2.0 %    Neutrophils Absolute 5.07 1.20 - 7.70 x10*3/uL    Immature Granulocytes Absolute, Automated 0.01 0.00 - 0.70 x10*3/uL    Lymphocytes Absolute 1.59 1.20 - 4.80 x10*3/uL    Monocytes Absolute 0.68 0.10 - 1.00 x10*3/uL    Eosinophils Absolute 0.07 0.00 - 0.70 x10*3/uL    Basophils Absolute 0.04 0.00 - 0.10 x10*3/uL   Sars-CoV-2 and Influenza A/B PCR    Collection Time: 04/16/24 12:02 PM   Result Value Ref Range    Flu A Result Not Detected Not Detected    Flu B Result Not Detected Not  Detected    Coronavirus 2019, PCR Not Detected Not Detected   Comprehensive Metabolic Panel    Collection Time: 04/16/24 12:48 PM   Result Value Ref Range    Glucose 94 65 - 99 mg/dL    Sodium 136 133 - 145 mmol/L    Potassium 3.2 (L) 3.4 - 5.1 mmol/L    Chloride 99 97 - 107 mmol/L    Bicarbonate 23 (L) 24 - 31 mmol/L    Urea Nitrogen 6 (L) 8 - 25 mg/dL    Creatinine 0.60 0.40 - 1.60 mg/dL    eGFR >90 >60 mL/min/1.73m*2    Calcium 9.3 8.5 - 10.4 mg/dL    Albumin 4.6 3.5 - 5.0 g/dL    Alkaline Phosphatase 57 35 - 125 U/L    Total Protein 7.5 5.9 - 7.9 g/dL    AST 20 5 - 40 U/L    Bilirubin, Total 0.7 0.1 - 1.2 mg/dL    ALT 10 5 - 40 U/L    Anion Gap 14 <=19 mmol/L   Acute Toxicology Panel, Blood    Collection Time: 04/16/24 12:48 PM   Result Value Ref Range    Acetaminophen <5.0 15.0 - 30.0 ug/mL    Salicylate  <0 3 - 25 mg/dL    Alcohol <0.010 0.000 - 0.010 g/dL         ED Course & [facility]   ED Course as of 04/16/24 1613 Tue Apr 16, 2024   1145 EKG Time:1142  EKG Interpretation time:1145  EKG Interpretation: EKG shows normal sinus rhythm with a rate of 88 bpm, normal axis, QTc 467, nonspecific ST changes but no evidence of STEMI.    EKG was interpreted by myself independently [JL]   1223 Unresponsiveness hypoxia tachycardia.  Requiring a additional dose of Narcan with return of consciousness pulse ox improved heart rate improved.  Notified ICU for admission after fentanyl overdose [TB]   1244 ICU notified patient requiring additional dose of Narcan due to cyanosis decreased respirations increased heart rate.  After the 2 mg of Narcan vital signs have improved patient is more alert. [TB]   1256 ICU team down to evaluate patient [TB]   1404 Patient being transferred to the ICU was having periods of apnea additional bolus of Narcan given [TB]      ED Course User Index  [JL] Terrance Mendoza,   [TB] Betzaida Brown, APRN-CNP         Diagnoses as of 04/16/24 1613   Intentional drug overdose, initial encounter (Multi)    Acute respiratory failure with hypoxia (Multi)   Suicide attempt (Multi)       Medical Decision Making  Patient is a 23-year-old female that presents emergency department for evaluation after an intentional overdose.  Patient awake, alert and very tearful on exam.  She does admit that she attempted to commit suicide and states she is regretful that she was unsuccessful.  She states that she feels she is a burden to her family and does not want to live anymore.  She is hemodynamically stable on initial presentation however given the large ingestion reported by patient as well as patient was given Narcan prior to arrival she will be observed for 4 to 6 hours.  I did discuss case with poison control who recommends a minimum of 4 to 6 hours observation after initial dose of Narcan.  Patient became more somnolent while in the emergency department requiring multiple doses of Narcan to maintain her oxygen saturation and adequate respiratory drive.  Blood work was ordered for medical clearance including CBC, CMP, COVID-19, influenza as well as a serum toxicology screen.  Blood work was unremarkable with normal creatinine of 0.6, negative acetaminophen, salicylate and alcohol levels.  EKG shows normal sinus rhythm with rate of 88 bpm, nonspecific ST changes but no evidence of STEMI.  Given the current need for dosing of Narcan patient will be admitted to ICU for close monitoring and in discussion with ICU will plan to start patient on a Narcan infusion.  Patient admitted to ICU in critical condition.    Patient was seen by both myself and advanced practitioner.  I personally saw the patient and made/approved the management plan and take responsibility for the patient management     I independently interpreted the following study(s) EKG which show sinus rhythm with nonspecific ST changes but no evidence of STEMI.    I personally discussed the patient's management with the ICU team who stated patient should be started on  Narcan infusion and they will accept patient to ICU.     I personally saw the patient and independently provided 35 minutes of non concurrent critical care out of the total shared critical care time provided.  This does not include time spent performing billable procedures.    This includes interpreting lab results, reviewing images, discussing case with consultants.        Procedure  Procedures     Terrance Mendoza DO  04/16/24 0255

## 2024-04-17 LAB
ALBUMIN SERPL-MCNC: 3.7 G/DL (ref 3.5–5)
ALP BLD-CCNC: 46 U/L (ref 35–125)
ALT SERPL-CCNC: 9 U/L (ref 5–40)
ANION GAP SERPL CALC-SCNC: 11 MMOL/L
AST SERPL-CCNC: 19 U/L (ref 5–40)
BILIRUB SERPL-MCNC: 0.6 MG/DL (ref 0.1–1.2)
BUN SERPL-MCNC: <3 MG/DL (ref 8–25)
CALCIUM SERPL-MCNC: 8.5 MG/DL (ref 8.5–10.4)
CHLORIDE SERPL-SCNC: 103 MMOL/L (ref 97–107)
CO2 SERPL-SCNC: 22 MMOL/L (ref 24–31)
CREAT SERPL-MCNC: 0.6 MG/DL (ref 0.4–1.6)
EGFRCR SERPLBLD CKD-EPI 2021: >90 ML/MIN/1.73M*2
ERYTHROCYTE [DISTWIDTH] IN BLOOD BY AUTOMATED COUNT: 13.3 % (ref 11.5–14.5)
GLUCOSE SERPL-MCNC: 100 MG/DL (ref 65–99)
HCT VFR BLD AUTO: 34.8 % (ref 36–46)
HGB BLD-MCNC: 11.2 G/DL (ref 12–16)
MAGNESIUM SERPL-MCNC: 1.8 MG/DL (ref 1.6–3.1)
MCH RBC QN AUTO: 28.5 PG (ref 26–34)
MCHC RBC AUTO-ENTMCNC: 32.2 G/DL (ref 32–36)
MCV RBC AUTO: 89 FL (ref 80–100)
NRBC BLD-RTO: 0 /100 WBCS (ref 0–0)
PLATELET # BLD AUTO: 217 X10*3/UL (ref 150–450)
POTASSIUM SERPL-SCNC: 3.6 MMOL/L (ref 3.4–5.1)
PROT SERPL-MCNC: 5.9 G/DL (ref 5.9–7.9)
RBC # BLD AUTO: 3.93 X10*6/UL (ref 4–5.2)
SODIUM SERPL-SCNC: 136 MMOL/L (ref 133–145)
WBC # BLD AUTO: 5.1 X10*3/UL (ref 4.4–11.3)

## 2024-04-17 PROCEDURE — 99232 SBSQ HOSP IP/OBS MODERATE 35: CPT

## 2024-04-17 PROCEDURE — 2500000004 HC RX 250 GENERAL PHARMACY W/ HCPCS (ALT 636 FOR OP/ED)

## 2024-04-17 PROCEDURE — 83735 ASSAY OF MAGNESIUM: CPT

## 2024-04-17 PROCEDURE — 2500000001 HC RX 250 WO HCPCS SELF ADMINISTERED DRUGS (ALT 637 FOR MEDICARE OP)

## 2024-04-17 PROCEDURE — 84075 ASSAY ALKALINE PHOSPHATASE: CPT

## 2024-04-17 PROCEDURE — 85027 COMPLETE CBC AUTOMATED: CPT

## 2024-04-17 PROCEDURE — 36415 COLL VENOUS BLD VENIPUNCTURE: CPT

## 2024-04-17 PROCEDURE — 2500000006 HC RX 250 W HCPCS SELF ADMINISTERED DRUGS (ALT 637 FOR ALL PAYERS)

## 2024-04-17 PROCEDURE — 2020000001 HC ICU ROOM DAILY

## 2024-04-17 PROCEDURE — 99291 CRITICAL CARE FIRST HOUR: CPT

## 2024-04-17 RX ORDER — ACETAMINOPHEN 325 MG/1
650 TABLET ORAL EVERY 6 HOURS PRN
Status: DISCONTINUED | OUTPATIENT
Start: 2024-04-17 | End: 2024-04-18 | Stop reason: HOSPADM

## 2024-04-17 RX ORDER — VENLAFAXINE HYDROCHLORIDE 37.5 MG/1
37.5 CAPSULE, EXTENDED RELEASE ORAL
Status: DISCONTINUED | OUTPATIENT
Start: 2024-04-17 | End: 2024-04-18 | Stop reason: HOSPADM

## 2024-04-17 RX ORDER — POTASSIUM CHLORIDE 20 MEQ/1
20 TABLET, EXTENDED RELEASE ORAL ONCE
Status: COMPLETED | OUTPATIENT
Start: 2024-04-17 | End: 2024-04-17

## 2024-04-17 RX ORDER — HYDROXYZINE HYDROCHLORIDE 25 MG/1
25 TABLET, FILM COATED ORAL EVERY 6 HOURS PRN
Status: DISCONTINUED | OUTPATIENT
Start: 2024-04-17 | End: 2024-04-18 | Stop reason: HOSPADM

## 2024-04-17 RX ADMIN — NALOXONE HYDROCHLORIDE 75 ML/HR: 1 INJECTION PARENTERAL at 01:06

## 2024-04-17 RX ADMIN — POTASSIUM CHLORIDE 20 MEQ: 1500 TABLET, EXTENDED RELEASE ORAL at 07:27

## 2024-04-17 RX ADMIN — SODIUM CHLORIDE, SODIUM LACTATE, POTASSIUM CHLORIDE, AND CALCIUM CHLORIDE 50 ML/HR: 600; 310; 30; 20 INJECTION, SOLUTION INTRAVENOUS at 09:36

## 2024-04-17 RX ADMIN — HYDROXYZINE HYDROCHLORIDE 25 MG: 25 TABLET, FILM COATED ORAL at 22:30

## 2024-04-17 RX ADMIN — VENLAFAXINE HYDROCHLORIDE 37.5 MG: 37.5 CAPSULE, EXTENDED RELEASE ORAL at 07:29

## 2024-04-17 RX ADMIN — ACETAMINOPHEN 650 MG: 325 TABLET ORAL at 07:27

## 2024-04-17 ASSESSMENT — ENCOUNTER SYMPTOMS
ARTHRALGIAS: 0
DECREASED CONCENTRATION: 0
WEAKNESS: 1
DYSPHORIC MOOD: 1
ACTIVITY CHANGE: 1
SLEEP DISTURBANCE: 0
HYPERACTIVE: 1
FATIGUE: 1
VOMITING: 0
NAUSEA: 0
MYALGIAS: 0
CONFUSION: 0
HYPERACTIVE: 0
HALLUCINATIONS: 0
FATIGUE: 0
DECREASED CONCENTRATION: 1
AGITATION: 0
SLEEP DISTURBANCE: 1
NERVOUS/ANXIOUS: 1
DIFFICULTY URINATING: 1

## 2024-04-17 ASSESSMENT — PAIN SCALES - GENERAL
PAINLEVEL_OUTOF10: 0 - NO PAIN
PAINLEVEL_OUTOF10: 0 - NO PAIN
PAINLEVEL_OUTOF10: 5 - MODERATE PAIN
PAINLEVEL_OUTOF10: 0 - NO PAIN

## 2024-04-17 ASSESSMENT — PAIN - FUNCTIONAL ASSESSMENT
PAIN_FUNCTIONAL_ASSESSMENT: 0-10

## 2024-04-17 ASSESSMENT — PAIN DESCRIPTION - LOCATION: LOCATION: HEAD

## 2024-04-17 ASSESSMENT — LIFESTYLE VARIABLES: TOTAL_SCORE: 7
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
## 2024-04-17 NOTE — SIGNIFICANT EVENT
Application for Emergency Admission      Ready for Transfer?  Is the patient medically cleared for transfer to inpatient psychiatry: Yes  Has the patient been accepted to an inpatient psychiatric hospital:     Application for Emergency Admission  IN ACCORDANCE WITH [place]122[place]  The Chief Clinical Officer of:      4/17/2024 .5:14 PM    Reason for Hospitalization  The undersigned has reason to believe that: Tete Kirk Is a mentally ill person subject to hospitalization by court order under division B Section 5122.01 of the Revised Code, i.e., this person:    1.Yes  Represents a substantial risk of physical harm to self as manifested by evidence of threats of, or attempts at, suicide or serious self-inflicted bodily harm    2.No Represents a substantial risk of physical harm to others as manifested by evidence of recent homicidal or other violent behavior, evidence of recent threats that place another in reasonable fear of violent behavior and serious physical harm, or other evidence of present dangerousness    3.Yes Represents a substantial and immediate risk of serious physical impairment or injury to self as manifested by  evidence that the person is unable to provide for and is not providing for the person's basic physical needs because of the person's mental illness and that appropriate provision for those needs cannot be made  immediately available in the community    4.Yes Would benefit from treatment in a hospital for his mental illness and is in need of such treatment as manifested by evidence of behavior that creates a grave and imminent risk to substantial rights of others or  himself.    5.Yes Would benefit from treatment as manifested by evidence of behavior that indicates all of the following:       (a) The person is unlikely to survive safely in the community without supervision, based on a clinical determination.       (b) The person has a history of lack of compliance with treatment for  mental illness and one of the following applies:      (i) At least twice within the thirty-six months prior to the filing of an affidavit seeking court-ordered treatment of the person under section 5122.111 of the Revised Code, the lack of compliance has been a significant factor in necessitating hospitalization in a hospital or receipt of services in a forensic or other mental health unit of a correctional facility, provided that the thirty-six-month period shall be extended by the length of any hospitalization or incarceration of the person that occurred within the thirty-six-month period.      (ii) Within the forty-eight months prior to the filing of an affidavit seeking court-ordered treatment of the person under section 5122.111 of the Revised Code, the lack of compliance resulted in one or more acts of serious violent behavior toward self or others or threats of, or attempts at, serious physical harm to self or others, provided that the forty-eight-month period shall be extended by the length of any hospitalization or incarceration of the person that occurred within the forty-eight-month period.      (c) The person, as a result of mental illness, is unlikely to voluntarily participate in necessary treatment.       (d) In view of the person's treatment history and current behavior, the person is in need of treatment in order to prevent a relapse or deterioration that would be likely to result in substantial risk of serious harm to the person or others.    (e) Represents a substantial risk of physical harm to self or others if allowed to remain at liberty pending examination.    Therefore, it is requested that said person be admitted to the above named facility.    STATEMENT OF BELIEF    Must be filled out by one of the following: a psychiatrist, licensed physician, licensed clinical psychologist, health or ,  or .  (Statement shall include the circumstances under which the  individual was taken into custody and the reason for the person's belief that hospitalization is necessary. The statement shall also include a reference to efforts made to secure the individual's property at his residence if he was taken into custody there. Every reasonable and appropriate effort should be made to take this person into custody in the least conspicuous manner possible.)    Serious Suicide Attempt and the patient is unable to contract to safety at this time. The patient admits intent to end her life by overdose with Fentanyl, this is her 3rd serious attempt. The patient would benefit from inpatient placement for her safety and to establish proper medication regimen.     SUSANA Williamson 4/17/2024     _____________________________________________________________   Place of Employment: [facility]    STATEMENT OF OBSERVATION BY PSYCHIATRIST, LICENSED PHYSICIAN, OR LICENSED CLINICAL PSYCHOLOGIST, IF APPLICABLE    Place of Observation (e.g., [facility], general hospital, office, emergency facility)  (If applicable, please complete)    SUSANA Williamson 4/17/2024    _____________________________________________________________

## 2024-04-17 NOTE — CARE PLAN
The patient's goals for the shift include more awake and be able to eat    The clinical goals for the shift include Maintain airway and hemodynamics    Problem: Pain  Goal: My pain/discomfort is manageable  Outcome: Progressing     Problem: Safety  Goal: Patient will be injury free during hospitalization  Outcome: Progressing  Goal: I will remain free of falls  Outcome: Progressing     Problem: Daily Care  Goal: Daily care needs are met  Outcome: Progressing     Problem: Psychosocial Needs  Goal: Demonstrates ability to cope with hospitalization/illness  Outcome: Progressing  Goal: Collaborate with me, my family, and caregiver to identify my specific goals  Outcome: Progressing     Problem: Discharge Barriers  Goal: My discharge needs are met  Outcome: Progressing     Problem: Self-Injury  Goal: LTG: Tete will have increased insight into factors impacting self-harm urges and utilize coping skills to reduce self-harm  Outcome: Progressing  Goal: STG: Tete and staff will complete a safety plan  Outcome: Progressing  Goal: STG: Tete will be able to notify staff when experiencing harmful thoughts towards themselves or others  Outcome: Progressing  Goal: STG: Tete will have no self-injury for 72 hours prior to discharge  Outcome: Progressing  Goal: STG: Tete will complete at least 80% of assigned worksheets  Outcome: Progressing  Goal: STG: Tete will cooperate with urine toxicology screenings as requested  Outcome: Progressing  Goal: STG: Tete will participate with recovery peer support activities  Outcome: Progressing     Problem: Risk for Suicide  Goal: Accepts medications as prescribed/needed this shift  Outcome: Progressing  Goal: Identifies supports this shift  Outcome: Progressing  Goal: Makes needs known through verbalization or behaviors this shift  Outcome: Progressing  Goal: No self harm this shift  Outcome: Progressing  Goal: Read Safety Guidelines this shift  Outcome: Progressing  Goal: Complete Mental Health  Safety Plan (psychiatry only) this shift  Outcome: Progressing     Problem: Safety - Adult  Goal: Free from fall injury  Outcome: Progressing     Problem: Discharge Planning  Goal: Discharge to home or other facility with appropriate resources  Outcome: Progressing     Problem: Skin  Goal: Decreased wound size/increased tissue granulation at next dressing change  Outcome: Progressing  Flowsheets (Taken 4/16/2024 2136)  Decreased wound size/increased tissue granulation at next dressing change:   Promote sleep for wound healing   Protective dressings over bony prominences  Goal: Participates in plan/prevention/treatment measures  Outcome: Progressing  Flowsheets (Taken 4/16/2024 2136)  Participates in plan/prevention/treatment measures:   Elevate heels   Increase activity/out of bed for meals   Discuss with provider PT/OT consult  Goal: Prevent/manage excess moisture  Outcome: Progressing  Flowsheets (Taken 4/16/2024 2136)  Prevent/manage excess moisture:   Moisturize dry skin   Monitor for/manage infection if present  Goal: Prevent/minimize sheer/friction injuries  Outcome: Progressing  Flowsheets (Taken 4/16/2024 2136)  Prevent/minimize sheer/friction injuries:   Complete micro-shifts as needed if patient unable. Adjust patient position to relieve pressure points, not a full turn   Increase activity/out of bed for meals   Use pull sheet   HOB 30 degrees or less   Turn/reposition every 2 hours/use positioning/transfer devices   Utilize specialty bed per algorithm  Goal: Promote/optimize nutrition  Outcome: Progressing  Flowsheets (Taken 4/16/2024 2136)  Promote/optimize nutrition:   Monitor/record intake including meals   Consume > 50% meals/supplements   Offer water/supplements/favorite foods  Goal: Promote skin healing  Outcome: Progressing  Flowsheets (Taken 4/16/2024 2136)  Promote skin healing:   Assess skin/pad under line(s)/device(s)   Protective dressings over bony prominences   Turn/reposition every 2  hours/use positioning/transfer devices     Problem: Fall/Injury  Goal: Not fall by end of shift  Outcome: Progressing  Goal: Be free from injury by end of the shift  Outcome: Progressing  Goal: Verbalize understanding of personal risk factors for fall in the hospital  Outcome: Progressing  Goal: Verbalize understanding of risk factor reduction measures to prevent injury from fall in the home  Outcome: Progressing  Goal: Use assistive devices by end of the shift  Outcome: Progressing  Goal: Pace activities to prevent fatigue by end of the shift  Outcome: Progressing

## 2024-04-17 NOTE — NURSING NOTE
Pt out of bed at 1000. Repeat BP taken while sitting and standing. Pt with no complaints of dizziness. Ambulated to sink where she bathed, brushed her teeth and hair and changed clothes with no assistance. Linens changed and pt sitting in chair at bedside. Morillo removed prior to pt bathing. At this time pt has yet to urinate, encouraging fluid intake. Pt's mom at the bedside.

## 2024-04-17 NOTE — PROGRESS NOTES
"[facility] Critical Care Medicine       Date:  4/17/2024  Patient:  Tete Kirk  YOB: 2000  MRN:  [id number]   Admit Date:  4/16/2024    Chief Complaint   Patient presents with    Drug Overdose    Suicide Attempt       Patient is a 23 y.o. female admitted on 4/16/2024 11:33 AM with the following indication(s) for ICU care overdose/narcan gtt.   Hospital day 1 ICU day 19h     History of Present Illness:  Tete Kirk is a 23 y.o. year old female patient with Past Medical History of depression, suicidal ideation and suicide attempt in 2/2024 who presented to ER today via EMS after being found in a motel room after suspected drug overdose. Upon EMS arrival, patient was agonal breathing and treated with intranasal narcan, she remained drowsy with periods of apnea noted in the ER where she was treated with an additional two doses of narcan. Per ER, patient reports to have ingested 95 fentanyl pills, and was found with white powder surrounding external nares indicative of amphetamines. Due to patient frequent narcan dosing and continuing periods of drowsiness with concern for airway protection, ICU consulted and accepted for admission.     ER course: Resp viral panel negative, CBC negative, CMP shows K 3.2. EKG shows NSR 88bpm, no ST/T wave abnormalities, QTc prolonged 467. UDS, HCG pending.        Interval ICU Events:  4/16: Admitted to ICU, narcan infusion initiated s/p multiple intermittent narcan doses in EMS/ER. Patient is arousable, drowsy, states she just wants to \"sleep and die\". Mom at bedside, discussed treatment plan and possible need for intubation if airway compromise is suspected. Per mom, patient is actively in outpatient therapy at [facility], she sees a psychologist and psychiatrist regularly, she takes antidepressants but she is unsure of the name but the dosage was just increased. She also states Tete has been attending AA/NA meetings, in the ER today she told her mom that she pretends " "to be okay but everyday is a fight for her and it's too much. Sitter at bedside, suicide precautions in place.    4/17: Narcan gtt had to be increased to 3mg/hr last night due to apnea, then back down to 2mg/hr.  This AM patient is awake and alert.  Narcan gtt decreased to 1mg/min, then discontinued.    Medical History:  Past Medical History:   Diagnosis Date    Depression     Suicidal ideation     Suicide attempt (Multi)      Past Surgical History:   Procedure Laterality Date    OTHER SURGICAL HISTORY  01/03/2023    Tonsillectomy     Medications Prior to Admission   Medication Sig Dispense Refill Last Dose    clonazePAM (KlonoPIN) 0.5 mg tablet Take 1 tablet (0.5 mg) by mouth once daily.       venlafaxine XR (Effexor-XR) 75 mg 24 hr capsule Take 1 capsule (75 mg) by mouth once daily. Do not crush or chew.        Patient has no known allergies.  Social History     Tobacco Use    Smoking status: Former     Types: Cigarettes    Smokeless tobacco: Never   Vaping Use    Vaping status: Every Day   Substance Use Topics    Alcohol use: Yes     Comment: relapsed last drink today but prior til today last drink was a month ago    Drug use: Yes     Types: Fentanyl, \"Crack\" cocaine, Cocaine, Amphetamines     No family history on file.    Hospital Medications:    dextrose 5 % in water (D5W) 500 mL with naloxone 20 mg infusion, 50 mL/hr, Last Rate: 25 mL/hr (04/17/24 0940)  lactated Ringer's, 50 mL/hr, Last Rate: 50 mL/hr (04/17/24 0940)          Current Facility-Administered Medications:     acetaminophen (Tylenol) tablet 650 mg, 650 mg, oral, q6h PRN, Hugh R Kiss, APRN-CNP, 650 mg at 04/17/24 0727    dextrose 5 % in water (D5W) 500 mL with naloxone 20 mg infusion, 50 mL/hr, intravenous, Continuous, Hugh R Kiss, APRN-CNP, Last Rate: 25 mL/hr at 04/17/24 0940, 25 mL/hr at 04/17/24 0940    hydrOXYzine HCL (Atarax) tablet 25 mg, 25 mg, oral, q6h PRN, Ariol Tafa, APRN-CNP    lactated Ringer's infusion, 50 mL/hr, intravenous, " "Continuous, Yanci CALLUM Ap APRN-CNP, Last Rate: 50 mL/hr at 04/17/24 0940, 50 mL/hr at 04/17/24 0940    naloxone (Narcan) injection 0.4 mg, 0.4 mg, intravenous, PRN, Hugh Rapp, APRN-CNP, 0.4 mg at 04/16/24 2353    naloxone (Narcan) injection 2 mg, 2 mg, intravenous, PRN, Hugherickson Rapp, APRN-CNP    oxygen (O2) therapy, , inhalation, Continuous - Inhalation, Yanci CALLUM Ap, APRN-CNP, 2 L/min at 04/17/24 0400    venlafaxine XR (Effexor-XR) 24 hr capsule 37.5 mg, 37.5 mg, oral, Daily with breakfast, Sunday Sanchez APRN-CNP, 37.5 mg at 04/17/24 0729    Review of Systems:  14 point review of systems was completed and negative except for those specially mention in my HPI    Physical Exam:    Heart Rate:  []   Temp:  [36.6 °C (97.9 °F)-36.9 °C (98.4 °F)]   Resp:  [0-23]   BP: ()/(45-96)   Height:  [157.5 cm (5' 2\")]   Weight:  [49.7 kg (109 lb 9.1 oz)-52.2 kg (115 lb)]   SpO2:  [84 %-100 %]     Physical Exam  Constitutional:       General: She is not in acute distress.  HENT:      Head: Normocephalic and atraumatic.      Mouth/Throat:      Mouth: Mucous membranes are moist.      Pharynx: Oropharynx is clear.   Eyes:      Extraocular Movements: Extraocular movements intact.      Conjunctiva/sclera: Conjunctivae normal.      Pupils: Pupils are equal, round, and reactive to light.   Cardiovascular:      Rate and Rhythm: Normal rate and regular rhythm.      Pulses:           Radial pulses are 2+ on the right side and 2+ on the left side.        Dorsalis pedis pulses are 2+ on the right side and 2+ on the left side.   Pulmonary:      Breath sounds: Normal breath sounds and air entry.   Abdominal:      General: Bowel sounds are normal.      Palpations: Abdomen is soft.   Musculoskeletal:      Cervical back: Normal range of motion and neck supple.      Right lower leg: No edema.      Left lower leg: No edema.      Comments: 5/5 strength to all extremities   Skin:     General: Skin is warm and dry.      Capillary " Refill: Capillary refill takes less than 2 seconds.   Neurological:      Mental Status: She is alert and oriented to person, place, and time.      GCS: GCS eye subscore is 4. GCS verbal subscore is 5. GCS motor subscore is 6.      Cranial Nerves: Cranial nerves 2-12 are intact.   Psychiatric:         Mood and Affect: Mood is depressed.         Speech: Speech normal.         Objective:    I have reviewed all medications, laboratory results, and imaging pertinent for today's encounter.           Intake/Output Summary (Last 24 hours) at 4/17/2024 0941  Last data filed at 4/17/2024 0940  Gross per 24 hour   Intake 1983.96 ml   Output 2150 ml   Net -166.04 ml       Results for orders placed or performed during the hospital encounter of 04/16/24 (from the past 24 hour(s))   Electrocardiogram, 12-lead   Result Value Ref Range    Ventricular Rate 88 BPM    Atrial Rate 88 BPM    NV Interval 118 ms    QRS Duration 88 ms    QT Interval 386 ms    QTC Calculation(Bazett) 467 ms    P Axis 77 degrees    R Axis 68 degrees    T Axis 24 degrees    QRS Count 14 beats    Q Onset 218 ms    P Onset 159 ms    P Offset 201 ms    T Offset 411 ms    QTC Fredericia 438 ms   CBC and Auto Differential   Result Value Ref Range    WBC 7.5 4.4 - 11.3 x10*3/uL    nRBC 0.0 0.0 - 0.0 /100 WBCs    RBC 4.55 4.00 - 5.20 x10*6/uL    Hemoglobin 12.7 12.0 - 16.0 g/dL    Hematocrit 39.0 36.0 - 46.0 %    MCV 86 80 - 100 fL    MCH 27.9 26.0 - 34.0 pg    MCHC 32.6 32.0 - 36.0 g/dL    RDW 13.3 11.5 - 14.5 %    Platelets 231 150 - 450 x10*3/uL    Neutrophils % 68.1 40.0 - 80.0 %    Immature Granulocytes %, Automated 0.1 0.0 - 0.9 %    Lymphocytes % 21.3 13.0 - 44.0 %    Monocytes % 9.1 2.0 - 10.0 %    Eosinophils % 0.9 0.0 - 6.0 %    Basophils % 0.5 0.0 - 2.0 %    Neutrophils Absolute 5.07 1.20 - 7.70 x10*3/uL    Immature Granulocytes Absolute, Automated 0.01 0.00 - 0.70 x10*3/uL    Lymphocytes Absolute 1.59 1.20 - 4.80 x10*3/uL    Monocytes Absolute 0.68 0.10 -  1.00 x10*3/uL    Eosinophils Absolute 0.07 0.00 - 0.70 x10*3/uL    Basophils Absolute 0.04 0.00 - 0.10 x10*3/uL   Sars-CoV-2 and Influenza A/B PCR   Result Value Ref Range    Flu A Result Not Detected Not Detected    Flu B Result Not Detected Not Detected    Coronavirus 2019, PCR Not Detected Not Detected   Comprehensive Metabolic Panel   Result Value Ref Range    Glucose 94 65 - 99 mg/dL    Sodium 136 133 - 145 mmol/L    Potassium 3.2 (L) 3.4 - 5.1 mmol/L    Chloride 99 97 - 107 mmol/L    Bicarbonate 23 (L) 24 - 31 mmol/L    Urea Nitrogen 6 (L) 8 - 25 mg/dL    Creatinine 0.60 0.40 - 1.60 mg/dL    eGFR >90 >60 mL/min/1.73m*2    Calcium 9.3 8.5 - 10.4 mg/dL    Albumin 4.6 3.5 - 5.0 g/dL    Alkaline Phosphatase 57 35 - 125 U/L    Total Protein 7.5 5.9 - 7.9 g/dL    AST 20 5 - 40 U/L    Bilirubin, Total 0.7 0.1 - 1.2 mg/dL    ALT 10 5 - 40 U/L    Anion Gap 14 <=19 mmol/L   Acute Toxicology Panel, Blood   Result Value Ref Range    Acetaminophen <5.0 15.0 - 30.0 ug/mL    Salicylate  <0 3 - 25 mg/dL    Alcohol <0.010 0.000 - 0.010 g/dL   Drug Screen, Urine   Result Value Ref Range    Amphetamine Screen, Urine Positive (A)      Barbiturate Screen, Urine Negative      Benzodiazepines Screen, Urine Negative      Cannabinoid Screen, Urine Negative      Cocaine Metabolite Screen, Urine Negative      Fentanyl Screen, Urine Positive (A)       Methadone Screen, Urine Negative      Opiate Screen, Urine Positive (A)      Oxycodone Screen, Urine Negative      PCP Screen, Urine Negative     hCG, Urine, Qualitative   Result Value Ref Range    HCG, Urine NEGATIVE NEGATIVE   Magnesium   Result Value Ref Range    Magnesium 1.80 1.60 - 3.10 mg/dL   CBC   Result Value Ref Range    WBC 5.1 4.4 - 11.3 x10*3/uL    nRBC 0.0 0.0 - 0.0 /100 WBCs    RBC 3.93 (L) 4.00 - 5.20 x10*6/uL    Hemoglobin 11.2 (L) 12.0 - 16.0 g/dL    Hematocrit 34.8 (L) 36.0 - 46.0 %    MCV 89 80 - 100 fL    MCH 28.5 26.0 - 34.0 pg    MCHC 32.2 32.0 - 36.0 g/dL    RDW  13.3 11.5 - 14.5 %    Platelets 217 150 - 450 x10*3/uL   Comprehensive metabolic panel   Result Value Ref Range    Glucose 100 (H) 65 - 99 mg/dL    Sodium 136 133 - 145 mmol/L    Potassium 3.6 3.4 - 5.1 mmol/L    Chloride 103 97 - 107 mmol/L    Bicarbonate 22 (L) 24 - 31 mmol/L    Urea Nitrogen <3 (L) 8 - 25 mg/dL    Creatinine 0.60 0.40 - 1.60 mg/dL    eGFR >90 >60 mL/min/1.73m*2    Calcium 8.5 8.5 - 10.4 mg/dL    Albumin 3.7 3.5 - 5.0 g/dL    Alkaline Phosphatase 46 35 - 125 U/L    Total Protein 5.9 5.9 - 7.9 g/dL    AST 19 5 - 40 U/L    Bilirubin, Total 0.6 0.1 - 1.2 mg/dL    ALT 9 5 - 40 U/L    Anion Gap 11 <=19 mmol/L     Assessment/Plan:     I am currently managing this critically ill patient for the following problems:     Neuro/Psych/Pain Ctrl/Sedation:  Intentional drug overdose  Hx suicidal ideation with suicide attempt 2/2024  Hx Depression  - Home med: Effexor 75mg daily and clonazepam 0.5mg daily  - UDS positive for amphetamines, fentanyl, and opiods  - ETOH negative  - Narcan gtt discontinued today (4/17)  - Holding home clonazepam  - Effexor 37.5mg Daily  - Hydroxyzine PRN for anxiety  - Maintain suicide/elopement precautions  - Psych consult: suicide attempt, hx of previous attempt, evaluation for IP psych     Respiratory/ENT:  Acute hypoxic respiratory failure in the setting of drug overdose  - O2 supplementation as needed for SpO2 >92%  - Low threshold for intubation if apnea, desaturation occurs     Cardiovascular:  Prolonged QTc  - Daily EKG  - Continuous telemetry     GI:  - Regular safety tray     Renal/Volume Status (Intra & Extravascular):  - Monitor UOP, maintain >0.5mL/kg/hr  - Remove hendrix today  - Daily CMP, Mg    Endocrine  - Monitor for s/s of hyper/hypoglycemia  - POCT PRN     Infectious Disease:  No concern for active infection  - Monitor for SIRS  - Daily CBC     Heme/Onc:  No active issues     OBGYN/MSK:  - HCG negative  - Activity as tolerated     Ethics/Code Status:  Full  Code  Update given to Kendra wolfe at bedside     :  DVT Prophylaxis: Low risk, SCDs  GI Prophylaxis: None  Bowel Regimen: None  Diet: Regular safety tray  CVC: None  [place]: None  Morillo: None  Restraints: None  Dispo: ICU, narcan infusion    Plan discussed with: Dr Dangelo  Critical Care Time:  60 minutes  Gloria Power APRN-CNP  Pulmonary and Critical Care Medicine

## 2024-04-17 NOTE — NURSING NOTE
Bladder scanned patient after failed attempt to void on bedpan. >537 mL present in bladder per scanner. Will report this to intensivist

## 2024-04-17 NOTE — NURSING NOTE
Assumed care of pt. Pt is resting in bed, medicated for complaints of headache. Pt given menu to order breakfast as she feels she is ready to eat. Safety sitter at bedside. Plan of care discussed

## 2024-04-17 NOTE — CARE PLAN
Patient has been stable off narcan gtt since this morning.  Morillo removed and patient voided sufficient amount.  Hemodynamically stable.  A&Ox's 3.  MEDICALLY CLEARED FOR TRANSFER TO INPATIENT PSYCH.
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
## 2024-04-18 VITALS
HEIGHT: 62 IN | BODY MASS INDEX: 20.33 KG/M2 | WEIGHT: 110.45 LBS | HEART RATE: 88 BPM | TEMPERATURE: 98.1 F | SYSTOLIC BLOOD PRESSURE: 103 MMHG | RESPIRATION RATE: 15 BRPM | DIASTOLIC BLOOD PRESSURE: 67 MMHG | OXYGEN SATURATION: 94 %

## 2024-04-18 PROBLEM — J96.01 ACUTE RESPIRATORY FAILURE WITH HYPOXIA (MULTI): Status: RESOLVED | Noted: 2024-04-16 | Resolved: 2024-04-18

## 2024-04-18 PROCEDURE — 99238 HOSP IP/OBS DSCHRG MGMT 30/<: CPT

## 2024-04-18 ASSESSMENT — PAIN SCALES - GENERAL: PAINLEVEL_OUTOF10: 0 - NO PAIN

## 2024-04-18 ASSESSMENT — PAIN - FUNCTIONAL ASSESSMENT: PAIN_FUNCTIONAL_ASSESSMENT: 0-10

## 2024-04-18 NOTE — PROGRESS NOTES
12:55 TCR from [facility] (intake) [facility]. Pt has been accepted and a bed is currently being held.    02:06 TCT [facility]. [facility] (intake) pink slip has been faxed. Pt has been accepted to [facility] under Dr. Posey to the 1600 unit. RN2RN [phone].

## 2024-04-18 NOTE — DISCHARGE SUMMARY
"Discharge Diagnosis  Intentional drug overdose, initial encounter (Multi)    Issues Requiring Follow-Up  Attempted suicide, Depression     Test Results Pending At Discharge  Pending Labs       No current pending labs.            Hospital Course   Tete Kirk is a 23 y.o. year old female patient with Past Medical History of depression, suicidal ideation and suicide attempt in 2/2024 who presented to ER today via EMS after being found in a motel room after suspected drug overdose. Upon EMS arrival, patient was agonal breathing and treated with intranasal narcan, she remained drowsy with periods of apnea noted in the ER where she was treated with an additional two doses of narcan. Per ER, patient reports to have ingested 95 fentanyl pills, and was found with white powder surrounding external nares indicative of amphetamines. Due to patient frequent narcan dosing and continuing periods of drowsiness with concern for airway protection, ICU consulted and accepted for admission.     Pt was admitted to the ICU and started on a narcan infusion.  Patient was arousable but drowsy on admission, stating she just wants to \"sleep and die\". Psychiatry was consulted, and a sitter was at bedside, with suicide precautions in place.  Pt mother was at the bedside and states that pt is in outpatient therapy at [place], sees a psychologist and psychiatrist regularly, is on antidepressants unsure of dosing but notes it was recently increased, and attends AA/NA meetings.  Psychiatry started the pt on venlafaxine and hydroxyzine to be adjusted as tolerated.  The narcan infusion was weaned off and psychiatry recommended inpatient psychiatric treatment at this time.  Pt was accepted at [facility] and will be transferred there for continued psychiatric treatment.      Pertinent Physical Exam At Time of Discharge  Physical Exam  Constitutional:       General: She is not in acute distress.  HENT:      Head: Normocephalic.      " Mouth/Throat:      Mouth: Mucous membranes are moist.      Pharynx: Oropharynx is clear.   Eyes:      Extraocular Movements: Extraocular movements intact.      Pupils: Pupils are equal, round, and reactive to light.   Cardiovascular:      Rate and Rhythm: Normal rate and regular rhythm.      Pulses: Normal pulses.      Heart sounds: Normal heart sounds.   Pulmonary:      Effort: Pulmonary effort is normal.      Breath sounds: Normal breath sounds. No wheezing or rhonchi.   Abdominal:      General: Bowel sounds are normal. There is no distension.      Palpations: Abdomen is soft.      Tenderness: There is no abdominal tenderness.   Musculoskeletal:         General: Normal range of motion.      Cervical back: Normal range of motion.   Skin:     General: Skin is warm and dry.      Capillary Refill: Capillary refill takes less than 2 seconds.   Neurological:      General: No focal deficit present.      Mental Status: She is alert and oriented to person, place, and time.   Psychiatric:         Mood and Affect: Mood is depressed. Affect is flat.         Speech: Speech is delayed.         Behavior: Behavior is withdrawn. Behavior is cooperative.         Thought Content: Thought content includes suicidal ideation. Thought content includes suicidal plan.         Pt is being transferred to [facility] for continued psychiatric treatment at this time.  Medications to be adjusted and pt will continue to be treated per staff at [facility].        Shelton Elmore PA-C

## 2024-05-05 ENCOUNTER — HOSPITAL ENCOUNTER (EMERGENCY)
Facility: HOSPITAL | Age: 24
Discharge: STILL ADMITTED OR ADMINISTRATIVE | End: 2024-05-05
Attending: EMERGENCY MEDICINE

## 2024-05-05 VITALS
RESPIRATION RATE: 18 BRPM | WEIGHT: 107.14 LBS | BODY MASS INDEX: 19.72 KG/M2 | TEMPERATURE: 97.2 F | OXYGEN SATURATION: 98 % | HEART RATE: 95 BPM | SYSTOLIC BLOOD PRESSURE: 116 MMHG | DIASTOLIC BLOOD PRESSURE: 85 MMHG | HEIGHT: 62 IN

## 2024-05-05 PROCEDURE — 4500999001 HC ED NO CHARGE

## 2024-05-05 ASSESSMENT — COLUMBIA-SUICIDE SEVERITY RATING SCALE - C-SSRS
2. HAVE YOU ACTUALLY HAD ANY THOUGHTS OF KILLING YOURSELF?: NO
1. IN THE PAST MONTH, HAVE YOU WISHED YOU WERE DEAD OR WISHED YOU COULD GO TO SLEEP AND NOT WAKE UP?: NO
6. HAVE YOU EVER DONE ANYTHING, STARTED TO DO ANYTHING, OR PREPARED TO DO ANYTHING TO END YOUR LIFE?: NO

## 2024-05-05 ASSESSMENT — PAIN - FUNCTIONAL ASSESSMENT: PAIN_FUNCTIONAL_ASSESSMENT: 0-10

## 2024-05-05 ASSESSMENT — PAIN SCALES - GENERAL: PAINLEVEL_OUTOF10: 0 - NO PAIN

## 2024-06-08 ENCOUNTER — HOSPITAL ENCOUNTER (EMERGENCY)
Facility: HOSPITAL | Age: 24
Discharge: HOME | End: 2024-06-08
Payer: MEDICAID

## 2024-06-08 VITALS
DIASTOLIC BLOOD PRESSURE: 79 MMHG | HEART RATE: 96 BPM | WEIGHT: 115 LBS | SYSTOLIC BLOOD PRESSURE: 120 MMHG | HEIGHT: 62 IN | RESPIRATION RATE: 18 BRPM | OXYGEN SATURATION: 97 % | BODY MASS INDEX: 21.16 KG/M2 | TEMPERATURE: 98.7 F

## 2024-06-08 DIAGNOSIS — N39.0 UTI (URINARY TRACT INFECTION), UNCOMPLICATED: ICD-10-CM

## 2024-06-08 DIAGNOSIS — K59.03 DRUG-INDUCED CONSTIPATION: Primary | ICD-10-CM

## 2024-06-08 LAB
ALBUMIN SERPL BCP-MCNC: 3.9 G/DL (ref 3.4–5)
ALP SERPL-CCNC: 50 U/L (ref 33–110)
ALT SERPL W P-5'-P-CCNC: 8 U/L (ref 7–45)
ANION GAP SERPL CALC-SCNC: 14 MMOL/L (ref 10–20)
APPEARANCE UR: ABNORMAL
AST SERPL W P-5'-P-CCNC: 14 U/L (ref 9–39)
BACTERIA #/AREA URNS AUTO: ABNORMAL /HPF
BASOPHILS # BLD AUTO: 0.03 X10*3/UL (ref 0–0.1)
BASOPHILS NFR BLD AUTO: 0.4 %
BILIRUB SERPL-MCNC: 0.5 MG/DL (ref 0–1.2)
BILIRUB UR STRIP.AUTO-MCNC: NEGATIVE MG/DL
BUN SERPL-MCNC: 5 MG/DL (ref 6–23)
CALCIUM SERPL-MCNC: 9.4 MG/DL (ref 8.6–10.6)
CHLORIDE SERPL-SCNC: 102 MMOL/L (ref 98–107)
CO2 SERPL-SCNC: 27 MMOL/L (ref 21–32)
COLOR UR: ABNORMAL
CREAT SERPL-MCNC: 0.54 MG/DL (ref 0.5–1.05)
EGFRCR SERPLBLD CKD-EPI 2021: >90 ML/MIN/1.73M*2
EOSINOPHIL # BLD AUTO: 0.28 X10*3/UL (ref 0–0.7)
EOSINOPHIL NFR BLD AUTO: 4 %
ERYTHROCYTE [DISTWIDTH] IN BLOOD BY AUTOMATED COUNT: 13.9 % (ref 11.5–14.5)
GLUCOSE SERPL-MCNC: 88 MG/DL (ref 74–99)
GLUCOSE UR STRIP.AUTO-MCNC: NORMAL MG/DL
HCT VFR BLD AUTO: 36 % (ref 36–46)
HGB BLD-MCNC: 12 G/DL (ref 12–16)
IMM GRANULOCYTES # BLD AUTO: 0.02 X10*3/UL (ref 0–0.7)
IMM GRANULOCYTES NFR BLD AUTO: 0.3 % (ref 0–0.9)
KETONES UR STRIP.AUTO-MCNC: NEGATIVE MG/DL
LEUKOCYTE ESTERASE UR QL STRIP.AUTO: ABNORMAL
LIPASE SERPL-CCNC: 7 U/L (ref 9–82)
LYMPHOCYTES # BLD AUTO: 1.71 X10*3/UL (ref 1.2–4.8)
LYMPHOCYTES NFR BLD AUTO: 24.4 %
MCH RBC QN AUTO: 27.6 PG (ref 26–34)
MCHC RBC AUTO-ENTMCNC: 33.3 G/DL (ref 32–36)
MCV RBC AUTO: 83 FL (ref 80–100)
MONOCYTES # BLD AUTO: 0.62 X10*3/UL (ref 0.1–1)
MONOCYTES NFR BLD AUTO: 8.8 %
MUCOUS THREADS #/AREA URNS AUTO: ABNORMAL /LPF
NEUTROPHILS # BLD AUTO: 4.35 X10*3/UL (ref 1.2–7.7)
NEUTROPHILS NFR BLD AUTO: 62.1 %
NITRITE UR QL STRIP.AUTO: NEGATIVE
NRBC BLD-RTO: 0 /100 WBCS (ref 0–0)
PH UR STRIP.AUTO: 7.5 [PH]
PLATELET # BLD AUTO: 216 X10*3/UL (ref 150–450)
POTASSIUM SERPL-SCNC: 3.9 MMOL/L (ref 3.5–5.3)
PREGNANCY TEST URINE, POC: NEGATIVE
PROT SERPL-MCNC: 6.4 G/DL (ref 6.4–8.2)
PROT UR STRIP.AUTO-MCNC: NEGATIVE MG/DL
RBC # BLD AUTO: 4.34 X10*6/UL (ref 4–5.2)
RBC # UR STRIP.AUTO: NEGATIVE /UL
RBC #/AREA URNS AUTO: ABNORMAL /HPF
SODIUM SERPL-SCNC: 139 MMOL/L (ref 136–145)
SP GR UR STRIP.AUTO: 1.01
SQUAMOUS #/AREA URNS AUTO: ABNORMAL /HPF
UROBILINOGEN UR STRIP.AUTO-MCNC: NORMAL MG/DL
WBC # BLD AUTO: 7 X10*3/UL (ref 4.4–11.3)
WBC #/AREA URNS AUTO: ABNORMAL /HPF

## 2024-06-08 PROCEDURE — 85025 COMPLETE CBC W/AUTO DIFF WBC: CPT | Performed by: PHYSICIAN ASSISTANT

## 2024-06-08 PROCEDURE — 83690 ASSAY OF LIPASE: CPT | Performed by: PHYSICIAN ASSISTANT

## 2024-06-08 PROCEDURE — 87086 URINE CULTURE/COLONY COUNT: CPT | Performed by: PHYSICIAN ASSISTANT

## 2024-06-08 PROCEDURE — 80053 COMPREHEN METABOLIC PANEL: CPT | Performed by: PHYSICIAN ASSISTANT

## 2024-06-08 PROCEDURE — 99283 EMERGENCY DEPT VISIT LOW MDM: CPT | Performed by: PHYSICIAN ASSISTANT

## 2024-06-08 PROCEDURE — 2500000005 HC RX 250 GENERAL PHARMACY W/O HCPCS: Mod: SE | Performed by: PHYSICIAN ASSISTANT

## 2024-06-08 PROCEDURE — 81025 URINE PREGNANCY TEST: CPT | Performed by: PHYSICIAN ASSISTANT

## 2024-06-08 PROCEDURE — 81001 URINALYSIS AUTO W/SCOPE: CPT | Performed by: PHYSICIAN ASSISTANT

## 2024-06-08 PROCEDURE — 36415 COLL VENOUS BLD VENIPUNCTURE: CPT | Performed by: PHYSICIAN ASSISTANT

## 2024-06-08 RX ORDER — SULFAMETHOXAZOLE AND TRIMETHOPRIM 800; 160 MG/1; MG/1
1 TABLET ORAL 2 TIMES DAILY
Qty: 6 TABLET | Refills: 0 | Status: SHIPPED | OUTPATIENT
Start: 2024-06-08 | End: 2024-06-11

## 2024-06-08 RX ADMIN — SODIUM PHOSPHATE 1 ENEMA: 7; 19 ENEMA RECTAL at 21:18

## 2024-06-08 ASSESSMENT — COLUMBIA-SUICIDE SEVERITY RATING SCALE - C-SSRS
1. IN THE PAST MONTH, HAVE YOU WISHED YOU WERE DEAD OR WISHED YOU COULD GO TO SLEEP AND NOT WAKE UP?: NO
2. HAVE YOU ACTUALLY HAD ANY THOUGHTS OF KILLING YOURSELF?: NO
6. HAVE YOU EVER DONE ANYTHING, STARTED TO DO ANYTHING, OR PREPARED TO DO ANYTHING TO END YOUR LIFE?: NO

## 2024-06-08 ASSESSMENT — LIFESTYLE VARIABLES
EVER HAD A DRINK FIRST THING IN THE MORNING TO STEADY YOUR NERVES TO GET RID OF A HANGOVER: NO
TOTAL SCORE: 0
HAVE PEOPLE ANNOYED YOU BY CRITICIZING YOUR DRINKING: NO
EVER FELT BAD OR GUILTY ABOUT YOUR DRINKING: NO
HAVE YOU EVER FELT YOU SHOULD CUT DOWN ON YOUR DRINKING: NO

## 2024-06-09 LAB — HOLD SPECIMEN: NORMAL

## 2024-06-09 NOTE — ED PROVIDER NOTES
Emergency Department Encounter  [facility] EMERGENCY MEDICINE    Patient: Tete Kirk  MRN: [id number]  : 2000  Date of Evaluation: 2024  ED Provider: Kendra Grigsby PA-C      Chief Complaint       Chief Complaint   Patient presents with    Abdominal Pain    Constipation     [place]    (Location/Symptom, Timing/Onset, Context/Setting, Quality, Duration, Modifying Factors, Severity) Note limiting factors.   Limitations to History: None  Historian: Patient  Records reviewed: EMR inpatient and outpatient notes, Care Everywhere      Tete Kirk is a 23 y.o. female who presents to the emergency department reporting cramping abdominal pain for the past 2 days.  Patient reports no bowel movement x 10 days.  She is taking Suboxone and has not been taking a laxative with this medication.  She reports that she tried MiraLAX at home which was unsuccessful.  She has worse pain in the right lower quadrant of the abdomen.  Nothing makes it better or worse.  She does have some pain with palpation in that area according to the patient.  She denies any fever, nausea or vomiting, sweats or chills.  Denies any chest pain or shortness of breath.  She has been eating and drinking normally.  She denies any other concerning symptoms at this time.    ROS:     Review of Systems  14 systems reviewed and otherwise acutely negative except as in the [place].      Past History     Past Medical History:   Diagnosis Date    Depression     Suicidal ideation     Suicide attempt (Multi)      Past Surgical History:   Procedure Laterality Date    OTHER SURGICAL HISTORY  2023    Tonsillectomy     Social History     Socioeconomic History    Marital status: Single     Spouse name: Not on file    Number of children: Not on file    Years of education: Not on file    Highest education level: Not on file   Occupational History    Not on file   Tobacco Use    Smoking status: Former     Types: Cigarettes    Smokeless tobacco: Never  "  Vaping Use    Vaping status: Every Day   Substance and Sexual Activity    Alcohol use: Yes     Comment: relapsed last drink today but prior til today last drink was a month ago    Drug use: Yes     Types: Fentanyl, \"Crack\" cocaine, Cocaine, Amphetamines    Sexual activity: Yes     Partners: Male   Other Topics Concern    Not on file   Social History Narrative    Not on file     Social Determinants of Health     Financial Resource Strain: Low Risk  (4/16/2024)    Overall Financial Resource Strain (CARDIA)     Difficulty of Paying Living Expenses: Not very hard   Food Insecurity: Not on file   Transportation Needs: No Transportation Needs (4/16/2024)    PRAPARE - Transportation     Lack of Transportation (Medical): No     Lack of Transportation (Non-Medical): No   Physical Activity: Not on file   Stress: Not on file   Social Connections: Not on file   Intimate Partner Violence: Not on file   Housing Stability: Low Risk  (4/16/2024)    Housing Stability Vital Sign     Unable to Pay for Housing in the Last Year: No     Number of Places Lived in the Last Year: 1     Unstable Housing in the Last Year: No       Medications/Allergies   Currently using Suboxone    Physical Exam       ED Triage Vitals [06/08/24 1731]   Temperature Heart Rate Respirations BP   37.1 °C (98.7 °F) 92 18 104/75      Pulse Ox Temp src Heart Rate Source Patient Position   98 % -- -- --      BP Location FiO2 (%)     -- --         Physical Exam    GENERAL:  The patient appears nourished and normally developed. Vital signs as documented.     HEENT:  Head normocephalic, atraumatic, EOMs intact, PERRLA, Mucous membranes moist. Nares patent without copious rhinorrhea.  No lymphadenopathy.    PULMONARY:  Lungs are clear to auscultation, without any respiratory distress. Able to speak full sentences, no accessory muscle use    CARDIAC:   Normal rate. No murmurs, rubs or gallops    ABDOMEN:  Soft, non distended, +tenderness in the RLQ with deep palpation, " no rebound or guarding. No masses or organomegaly.    MUSCULOSKELETAL:   Able to ambulate, Non edematous, with no obvious deformities. Pulses intact distal    SKIN:   Good color, with no significant rashes.  No pallor.    NEURO:  No obvious neurological deficits, normal sensation and strength bilaterally.  Able to follow commands        Diagnostics   Labs:  Labs Reviewed   COMPREHENSIVE METABOLIC PANEL - Abnormal       Result Value    Glucose 88      Sodium 139      Potassium 3.9      Chloride 102      Bicarbonate 27      Anion Gap 14      Urea Nitrogen 5 (*)     Creatinine 0.54      eGFR >90      Calcium 9.4      Albumin 3.9      Alkaline Phosphatase 50      Total Protein 6.4      AST 14      Bilirubin, Total 0.5      ALT 8     LIPASE - Abnormal    Lipase 7 (*)     Narrative:     Venipuncture immediately after or during the administration of Metamizole may lead to falsely low results. Testing should be performed immediately prior to Metamizole dosing.   URINALYSIS WITH REFLEX CULTURE AND MICROSCOPIC - Abnormal    Color, Urine Light-Yellow      Appearance, Urine Turbid (*)     Specific Gravity, Urine 1.006      pH, Urine 7.5      Protein, Urine NEGATIVE      Glucose, Urine Normal      Blood, Urine NEGATIVE      Ketones, Urine NEGATIVE      Bilirubin, Urine NEGATIVE      Urobilinogen, Urine Normal      Nitrite, Urine NEGATIVE      Leukocyte Esterase, Urine 250 Kati/µL (*)    MICROSCOPIC ONLY, URINE - Abnormal    WBC, Urine 21-50 (*)     RBC, Urine 1-2      Squamous Epithelial Cells, Urine 26-50 (1+)      Bacteria, Urine 1+ (*)     Mucus, Urine FEW     POCT PREGNANCY, URINE - Normal    Preg Test, Ur Negative     URINE CULTURE   CBC WITH AUTO DIFFERENTIAL    WBC 7.0      nRBC 0.0      RBC 4.34      Hemoglobin 12.0      Hematocrit 36.0      MCV 83      MCH 27.6      MCHC 33.3      RDW 13.9      Platelets 216      Neutrophils % 62.1      Immature Granulocytes %, Automated 0.3      Lymphocytes % 24.4      Monocytes % 8.8    "   Eosinophils % 4.0      Basophils % 0.4      Neutrophils Absolute 4.35      Immature Granulocytes Absolute, Automated 0.02      Lymphocytes Absolute 1.71      Monocytes Absolute 0.62      Eosinophils Absolute 0.28      Basophils Absolute 0.03     URINALYSIS WITH REFLEX CULTURE AND MICROSCOPIC    Narrative:     The following orders were created for panel order Urinalysis with Reflex Culture and Microscopic.  Procedure                               Abnormality         Status                     ---------                               -----------         ------                     Urinalysis with Reflex C...[[id number]]  Abnormal            Final result               Extra Urine Gray Tube[[id number]]                            In process                   Please view results for these tests on the individual orders.   EXTRA URINE GRAY TUBE       ED Course   Visit Vitals  /75   Pulse 92   Temp 37.1 °C (98.7 °F)   Resp 18   Ht 1.575 m (5' 2\")   Wt 52.2 kg (115 lb)   SpO2 98%   BMI 21.03 kg/m²   OB Status Unknown   Smoking Status Former   BSA 1.51 m²     Diagnoses as of 06/08/24 2147   Drug-induced constipation   UTI (urinary tract infection), uncomplicated     Medications   sodium phosphates (Fleets) 19-7 gram/118 mL enema 1 enema (1 enema rectal Given 6/8/24 2118)       Differentials   Drug-induced constipation  Appendicitis  Ovarian cyst  UTI      Assessment/MDM   In brief, Tete Kirk is a 23 y.o. female who presented to the emergency department with abdominal pain.  Abdomen was mildly tender and certainly nonsurgical.  Labs showed CBC with no leukocytosis or other abnormalities.  Signs of urinary tract infection found on urinalysis, however patient is asymptomatic.  UCG was negative.  Patient was given a fleets enema and was able to have a large bowel movement which relieved the discomfort and pain.  She was feeling completely better before discharge home.    Final Impression      1. Drug-induced constipation  "   2. UTI (urinary tract infection), uncomplicated          Plan   -Bactrim DS 1 tab twice daily x 3 days for UTI  -Increase fluid intake  -Colace 50 mg twice daily x 7 days  -Follow-up with PCP regarding UTI and opioid-induced constipation  -Eat a high-fiber diet  -Return to the emergency department with recurrent abdominal pain, fever, chills, sweats, or any other concerning symptom including nausea or vomiting.      DISPOSITION  Disposition: Discharge  Patient condition is: Improved    Comment: Please note this report has been produced using speech recognition software and may contain errors related to that system including errors in grammar, punctuation, and spelling, as well as words and phrases that may be inappropriate.  If there are any questions or concerns please feel free to contact the dictating provider for clarification.    CALVIN Wheatley PA-C  06/08/24 4078

## 2024-06-10 LAB — BACTERIA UR CULT: NORMAL
